# Patient Record
Sex: MALE | Race: WHITE | NOT HISPANIC OR LATINO | Employment: FULL TIME | URBAN - METROPOLITAN AREA
[De-identification: names, ages, dates, MRNs, and addresses within clinical notes are randomized per-mention and may not be internally consistent; named-entity substitution may affect disease eponyms.]

---

## 2019-01-09 ENCOUNTER — HOSPITAL ENCOUNTER (OUTPATIENT)
Dept: OTHER | Facility: HOSPITAL | Age: 44
Discharge: HOME | End: 2019-01-09
Attending: FAMILY MEDICINE

## 2019-01-09 LAB
25(OH)D3 SERPL-MCNC: 24 NG/ML (ref 30–100)
ALBUMIN SERPL-MCNC: 4.6 G/DL (ref 3.5–5)
ALBUMIN/GLOB SERPL: 1.6 {RATIO} (ref 1.4–2.6)
ALP SERPL-CCNC: 55 U/L (ref 53–128)
ALT SERPL-CCNC: 60 U/L (ref 10–40)
ANION GAP SERPL CALC-SCNC: 14 MMOL/L (ref 8–19)
AST SERPL-CCNC: 89 U/L (ref 15–50)
BILIRUB SERPL-MCNC: 0.58 MG/DL (ref 0.2–1.3)
BUN SERPL-MCNC: 15 MG/DL (ref 5–25)
BUN/CREAT SERPL: 13 {RATIO} (ref 6–20)
CALCIUM SERPL-MCNC: 9.2 MG/DL (ref 8.7–10.4)
CHLORIDE SERPL-SCNC: 100 MMOL/L (ref 99–111)
CONV CO2: 28 MMOL/L (ref 22–32)
CONV TOTAL PROTEIN: 7.5 G/DL (ref 6.3–8.2)
CREAT UR-MCNC: 1.18 MG/DL (ref 0.7–1.2)
GFR SERPLBLD BASED ON 1.73 SQ M-ARVRAT: >60 ML/MIN/{1.73_M2}
GLOBULIN UR ELPH-MCNC: 2.9 G/DL (ref 2–3.5)
GLUCOSE SERPL-MCNC: 99 MG/DL (ref 70–99)
OSMOLALITY SERPL CALC.SUM OF ELEC: 287 MOSM/KG (ref 273–304)
POTASSIUM SERPL-SCNC: 3.8 MMOL/L (ref 3.5–5.3)
SODIUM SERPL-SCNC: 138 MMOL/L (ref 135–147)
T4 FREE SERPL-MCNC: 1.1 NG/DL (ref 0.9–1.8)
TSH SERPL-ACNC: 2.76 M[IU]/L (ref 0.27–4.2)
URATE SERPL-MCNC: 10.2 MG/DL (ref 3.5–8.5)

## 2019-02-08 ENCOUNTER — HOSPITAL ENCOUNTER (OUTPATIENT)
Dept: ULTRASOUND IMAGING | Facility: HOSPITAL | Age: 44
Discharge: HOME | End: 2019-02-08
Attending: FAMILY MEDICINE

## 2019-02-08 LAB
ALBUMIN SERPL-MCNC: 4.4 G/DL (ref 3.5–5)
ALBUMIN/GLOB SERPL: 1.5 {RATIO} (ref 1.4–2.6)
ALP SERPL-CCNC: 59 U/L (ref 53–128)
ALT SERPL-CCNC: 33 U/L (ref 10–40)
ANION GAP SERPL CALC-SCNC: 17 MMOL/L (ref 8–19)
AST SERPL-CCNC: 27 U/L (ref 15–50)
BILIRUB SERPL-MCNC: 0.62 MG/DL (ref 0.2–1.3)
BUN SERPL-MCNC: 13 MG/DL (ref 5–25)
BUN/CREAT SERPL: 11 {RATIO} (ref 6–20)
CALCIUM SERPL-MCNC: 9.4 MG/DL (ref 8.7–10.4)
CHLORIDE SERPL-SCNC: 102 MMOL/L (ref 99–111)
CONV AFP: 2.2 NG/ML (ref 0–8.7)
CONV CO2: 25 MMOL/L (ref 22–32)
CONV TOTAL PROTEIN: 7.4 G/DL (ref 6.3–8.2)
CREAT UR-MCNC: 1.15 MG/DL (ref 0.7–1.2)
GFR SERPLBLD BASED ON 1.73 SQ M-ARVRAT: >60 ML/MIN/{1.73_M2}
GLOBULIN UR ELPH-MCNC: 3 G/DL (ref 2–3.5)
GLUCOSE SERPL-MCNC: 88 MG/DL (ref 70–99)
IRON SATN MFR SERPL: 18 % (ref 20–55)
IRON SERPL-MCNC: 89 UG/DL (ref 70–180)
OSMOLALITY SERPL CALC.SUM OF ELEC: 290 MOSM/KG (ref 273–304)
POTASSIUM SERPL-SCNC: 4 MMOL/L (ref 3.5–5.3)
SODIUM SERPL-SCNC: 140 MMOL/L (ref 135–147)
TIBC SERPL-MCNC: 505 UG/DL (ref 245–450)
TRANSFERRIN SERPL-MCNC: 353 MG/DL (ref 215–365)

## 2019-02-09 LAB
CONV HEPATITIS B SURFACE AG W CONFIRMATION RE: NEGATIVE
HAV IGM SERPL QL IA: NEGATIVE
HBV CORE IGM SERPL QL IA: NEGATIVE
HCV AB SER DONR QL: 0.2 S/CO RATIO (ref 0–0.9)
SMOOTH MUSCLE F-ACTIN AB IGG: 13 UNITS (ref 0–19)

## 2019-02-11 LAB — CONV ANTI NUCLEAR ANTIBODY WITH REFLEX: NEGATIVE

## 2019-02-12 LAB — COPPER SERPL-MCNC: 109 UG/DL (ref 72–166)

## 2019-04-26 ENCOUNTER — HOSPITAL ENCOUNTER (OUTPATIENT)
Dept: OTHER | Facility: HOSPITAL | Age: 44
Discharge: HOME | End: 2019-04-26
Attending: INTERNAL MEDICINE

## 2019-04-26 LAB
ANION GAP SERPL CALC-SCNC: 18 MMOL/L (ref 8–19)
BASOPHILS # BLD AUTO: 0.02 10*3/UL (ref 0–0.2)
BASOPHILS NFR BLD AUTO: 0.6 % (ref 0–3)
BUN SERPL-MCNC: 12 MG/DL (ref 5–25)
BUN/CREAT SERPL: 10 {RATIO} (ref 6–20)
CALCIUM SERPL-MCNC: 9.1 MG/DL (ref 8.7–10.4)
CHLORIDE SERPL-SCNC: 99 MMOL/L (ref 99–111)
CONV ABS IMM GRAN: 0.01 10*3/UL (ref 0–0.2)
CONV CO2: 24 MMOL/L (ref 22–32)
CONV IMMATURE GRAN: 0.3 % (ref 0–1.8)
CREAT UR-MCNC: 1.2 MG/DL (ref 0.7–1.2)
DEPRECATED RDW RBC AUTO: 40.4 FL (ref 35.1–43.9)
EOSINOPHIL # BLD AUTO: 0.21 10*3/UL (ref 0–0.7)
EOSINOPHIL # BLD AUTO: 6 % (ref 0–7)
ERYTHROCYTE [DISTWIDTH] IN BLOOD BY AUTOMATED COUNT: 13.7 % (ref 11.6–14.4)
GFR SERPLBLD BASED ON 1.73 SQ M-ARVRAT: >60 ML/MIN/{1.73_M2}
GLUCOSE SERPL-MCNC: 106 MG/DL (ref 70–99)
HBA1C MFR BLD: 12.6 G/DL (ref 14–18)
HCT VFR BLD AUTO: 40.8 % (ref 42–52)
LYMPHOCYTES # BLD AUTO: 1.13 10*3/UL (ref 1–5)
MCH RBC QN AUTO: 25.1 PG (ref 27–31)
MCHC RBC AUTO-ENTMCNC: 30.9 G/DL (ref 33–37)
MCV RBC AUTO: 81.3 FL (ref 80–96)
MONOCYTES # BLD AUTO: 0.33 10*3/UL (ref 0.2–1.2)
MONOCYTES NFR BLD AUTO: 9.4 % (ref 3–10)
NEUTROPHILS # BLD AUTO: 1.82 10*3/UL (ref 2–8)
NEUTROPHILS NFR BLD AUTO: 51.6 % (ref 30–85)
NRBC CBCN: 0 % (ref 0–0.7)
OSMOLALITY SERPL CALC.SUM OF ELEC: 284 MOSM/KG (ref 273–304)
PLATELET # BLD AUTO: 207 10*3/UL (ref 130–400)
PMV BLD AUTO: 9.1 FL (ref 9.4–12.4)
POTASSIUM SERPL-SCNC: 4 MMOL/L (ref 3.5–5.3)
RBC # BLD AUTO: 5.02 10*6/UL (ref 4.7–6.1)
SODIUM SERPL-SCNC: 137 MMOL/L (ref 135–147)
T4 FREE SERPL-MCNC: 1.3 NG/DL (ref 0.9–1.8)
TSH SERPL-ACNC: 4.07 M[IU]/L (ref 0.27–4.2)
VARIANT LYMPHS NFR BLD MANUAL: 32.1 % (ref 20–45)
WBC # BLD AUTO: 3.52 10*3/UL (ref 4.8–10.8)

## 2019-09-04 ENCOUNTER — HOSPITAL ENCOUNTER (OUTPATIENT)
Dept: ULTRASOUND IMAGING | Facility: HOSPITAL | Age: 44
Discharge: HOME | End: 2019-09-04
Attending: FAMILY MEDICINE

## 2019-10-17 ENCOUNTER — HOSPITAL ENCOUNTER (OUTPATIENT)
Dept: ULTRASOUND IMAGING | Facility: HOSPITAL | Age: 44
Discharge: HOME | End: 2019-10-17
Attending: FAMILY MEDICINE

## 2019-10-24 ENCOUNTER — HOSPITAL ENCOUNTER (OUTPATIENT)
Dept: OTHER | Facility: HOSPITAL | Age: 44
Discharge: HOME | End: 2019-10-24
Attending: FAMILY MEDICINE

## 2019-10-24 ENCOUNTER — OFFICE VISIT CONVERTED (OUTPATIENT)
Dept: GASTROENTEROLOGY | Facility: CLINIC | Age: 44
End: 2019-10-24
Attending: PHYSICIAN ASSISTANT

## 2019-10-24 LAB
25(OH)D3 SERPL-MCNC: 28.6 NG/ML (ref 30–100)
ALBUMIN SERPL-MCNC: 4.9 G/DL (ref 3.5–5)
ALBUMIN/GLOB SERPL: 1.8 {RATIO} (ref 1.4–2.6)
ALP SERPL-CCNC: 61 U/L (ref 53–128)
ALT SERPL-CCNC: 37 U/L (ref 10–40)
ANION GAP SERPL CALC-SCNC: 16 MMOL/L (ref 8–19)
AST SERPL-CCNC: 35 U/L (ref 15–50)
BILIRUB SERPL-MCNC: 0.39 MG/DL (ref 0.2–1.3)
BUN SERPL-MCNC: 14 MG/DL (ref 5–25)
BUN/CREAT SERPL: 14 {RATIO} (ref 6–20)
CALCIUM SERPL-MCNC: 9.7 MG/DL (ref 8.7–10.4)
CHLORIDE SERPL-SCNC: 104 MMOL/L (ref 99–111)
CONV CO2: 27 MMOL/L (ref 22–32)
CONV TOTAL PROTEIN: 7.7 G/DL (ref 6.3–8.2)
CREAT UR-MCNC: 0.97 MG/DL (ref 0.7–1.2)
GFR SERPLBLD BASED ON 1.73 SQ M-ARVRAT: >60 ML/MIN/{1.73_M2}
GLOBULIN UR ELPH-MCNC: 2.8 G/DL (ref 2–3.5)
GLUCOSE SERPL-MCNC: 85 MG/DL (ref 70–99)
OSMOLALITY SERPL CALC.SUM OF ELEC: 294 MOSM/KG (ref 273–304)
POTASSIUM SERPL-SCNC: 5 MMOL/L (ref 3.5–5.3)
SODIUM SERPL-SCNC: 142 MMOL/L (ref 135–147)
T4 FREE SERPL-MCNC: 1.2 NG/DL (ref 0.9–1.8)
TSH SERPL-ACNC: 3.01 M[IU]/L (ref 0.27–4.2)
URATE SERPL-MCNC: 8.7 MG/DL (ref 3.5–8.5)

## 2019-11-15 ENCOUNTER — HOSPITAL ENCOUNTER (OUTPATIENT)
Dept: GASTROENTEROLOGY | Facility: HOSPITAL | Age: 44
Setting detail: HOSPITAL OUTPATIENT SURGERY
Discharge: HOME | End: 2019-11-15
Attending: INTERNAL MEDICINE

## 2019-11-20 ENCOUNTER — HOSPITAL ENCOUNTER (OUTPATIENT)
Dept: GENERAL RADIOLOGY | Facility: HOSPITAL | Age: 44
Discharge: HOME | End: 2019-11-20
Attending: OTOLARYNGOLOGY

## 2019-11-20 LAB
ANION GAP SERPL CALC-SCNC: 17 MMOL/L (ref 8–19)
APTT BLD: 24 S (ref 22.2–34.2)
BASOPHILS # BLD AUTO: 0.03 10*3/UL (ref 0–0.2)
BASOPHILS NFR BLD AUTO: 0.6 % (ref 0–3)
BUN SERPL-MCNC: 13 MG/DL (ref 5–25)
BUN/CREAT SERPL: 11 {RATIO} (ref 6–20)
CALCIUM SERPL-MCNC: 9.6 MG/DL (ref 8.7–10.4)
CHLORIDE SERPL-SCNC: 100 MMOL/L (ref 99–111)
CONV ABS IMM GRAN: 0.02 10*3/UL (ref 0–0.2)
CONV CO2: 25 MMOL/L (ref 22–32)
CONV IMMATURE GRAN: 0.4 % (ref 0–1.8)
CREAT UR-MCNC: 1.16 MG/DL (ref 0.7–1.2)
DEPRECATED RDW RBC AUTO: 43.8 FL (ref 35.1–43.9)
EOSINOPHIL # BLD AUTO: 0.34 10*3/UL (ref 0–0.7)
EOSINOPHIL # BLD AUTO: 6.6 % (ref 0–7)
ERYTHROCYTE [DISTWIDTH] IN BLOOD BY AUTOMATED COUNT: 14.5 % (ref 11.6–14.4)
GFR SERPLBLD BASED ON 1.73 SQ M-ARVRAT: >60 ML/MIN/{1.73_M2}
GLUCOSE SERPL-MCNC: 92 MG/DL (ref 70–99)
HCT VFR BLD AUTO: 43.5 % (ref 42–52)
HGB BLD-MCNC: 13.7 G/DL (ref 14–18)
INR PPP: 0.96 (ref 2–3)
LYMPHOCYTES # BLD AUTO: 1.46 10*3/UL (ref 1–5)
LYMPHOCYTES NFR BLD AUTO: 28.4 % (ref 20–45)
MCH RBC QN AUTO: 26.3 PG (ref 27–31)
MCHC RBC AUTO-ENTMCNC: 31.5 G/DL (ref 33–37)
MCV RBC AUTO: 83.7 FL (ref 80–96)
MONOCYTES # BLD AUTO: 0.53 10*3/UL (ref 0.2–1.2)
MONOCYTES NFR BLD AUTO: 10.3 % (ref 3–10)
NEUTROPHILS # BLD AUTO: 2.76 10*3/UL (ref 2–8)
NEUTROPHILS NFR BLD AUTO: 53.7 % (ref 30–85)
NRBC CBCN: 0 % (ref 0–0.7)
OSMOLALITY SERPL CALC.SUM OF ELEC: 286 MOSM/KG (ref 273–304)
PLATELET # BLD AUTO: 197 10*3/UL (ref 130–400)
PMV BLD AUTO: 9.2 FL (ref 9.4–12.4)
POTASSIUM SERPL-SCNC: 3.6 MMOL/L (ref 3.5–5.3)
PROTHROMBIN TIME: 10.4 S (ref 9.4–12)
RBC # BLD AUTO: 5.2 10*6/UL (ref 4.7–6.1)
SODIUM SERPL-SCNC: 138 MMOL/L (ref 135–147)
T4 FREE SERPL-MCNC: 1.2 NG/DL (ref 0.9–1.8)
TSH SERPL-ACNC: 2.86 M[IU]/L (ref 0.27–4.2)
WBC # BLD AUTO: 5.14 10*3/UL (ref 4.8–10.8)

## 2019-12-09 ENCOUNTER — HOSPITAL ENCOUNTER (OUTPATIENT)
Dept: OTHER | Facility: HOSPITAL | Age: 44
Discharge: HOME | End: 2019-12-09
Attending: PHYSICIAN ASSISTANT

## 2019-12-09 LAB
CALCIUM SERPL-MCNC: 10.4 MG/DL (ref 8.7–10.4)
PTH-INTACT SERPL-MCNC: 22.3 PG/ML (ref 11.1–79.5)

## 2020-01-29 ENCOUNTER — HOSPITAL ENCOUNTER (OUTPATIENT)
Dept: OTHER | Facility: HOSPITAL | Age: 45
Discharge: HOME | End: 2020-01-29
Attending: FAMILY MEDICINE

## 2020-01-29 LAB
25(OH)D3 SERPL-MCNC: 26.2 NG/ML (ref 30–100)
ALBUMIN SERPL-MCNC: 4.7 G/DL (ref 3.5–5)
ALBUMIN/GLOB SERPL: 1.6 {RATIO} (ref 1.4–2.6)
ALP SERPL-CCNC: 53 U/L (ref 53–128)
ALT SERPL-CCNC: 45 U/L (ref 10–40)
ANION GAP SERPL CALC-SCNC: 16 MMOL/L (ref 8–19)
AST SERPL-CCNC: 31 U/L (ref 15–50)
BILIRUB SERPL-MCNC: 0.46 MG/DL (ref 0.2–1.3)
BUN SERPL-MCNC: 16 MG/DL (ref 5–25)
BUN/CREAT SERPL: 14 {RATIO} (ref 6–20)
CALCIUM SERPL-MCNC: 9.9 MG/DL (ref 8.7–10.4)
CHLORIDE SERPL-SCNC: 102 MMOL/L (ref 99–111)
CONV CO2: 26 MMOL/L (ref 22–32)
CONV TOTAL PROTEIN: 7.7 G/DL (ref 6.3–8.2)
CREAT UR-MCNC: 1.12 MG/DL (ref 0.7–1.2)
GFR SERPLBLD BASED ON 1.73 SQ M-ARVRAT: >60 ML/MIN/{1.73_M2}
GLOBULIN UR ELPH-MCNC: 3 G/DL (ref 2–3.5)
GLUCOSE SERPL-MCNC: 87 MG/DL (ref 70–99)
OSMOLALITY SERPL CALC.SUM OF ELEC: 291 MOSM/KG (ref 273–304)
POTASSIUM SERPL-SCNC: 4 MMOL/L (ref 3.5–5.3)
SODIUM SERPL-SCNC: 140 MMOL/L (ref 135–147)
T4 FREE SERPL-MCNC: 1.3 NG/DL (ref 0.9–1.8)
TSH SERPL-ACNC: 8.03 M[IU]/L (ref 0.27–4.2)
URATE SERPL-MCNC: 6.2 MG/DL (ref 3.5–8.5)

## 2020-05-06 ENCOUNTER — HOSPITAL ENCOUNTER (OUTPATIENT)
Dept: OTHER | Facility: HOSPITAL | Age: 45
Discharge: HOME | End: 2020-05-06
Attending: FAMILY MEDICINE

## 2020-05-06 LAB
ALBUMIN SERPL-MCNC: 4.4 G/DL (ref 3.5–5)
ALBUMIN/GLOB SERPL: 1.5 {RATIO} (ref 1.4–2.6)
ALP SERPL-CCNC: 62 U/L (ref 53–128)
ALT SERPL-CCNC: 43 U/L (ref 10–40)
ANION GAP SERPL CALC-SCNC: 20 MMOL/L (ref 8–19)
AST SERPL-CCNC: 32 U/L (ref 15–50)
BILIRUB SERPL-MCNC: 0.45 MG/DL (ref 0.2–1.3)
BUN SERPL-MCNC: 13 MG/DL (ref 5–25)
BUN/CREAT SERPL: 11 {RATIO} (ref 6–20)
CALCIUM SERPL-MCNC: 9.2 MG/DL (ref 8.7–10.4)
CHLORIDE SERPL-SCNC: 101 MMOL/L (ref 99–111)
CONV CO2: 23 MMOL/L (ref 22–32)
CONV TOTAL PROTEIN: 7.4 G/DL (ref 6.3–8.2)
CREAT UR-MCNC: 1.19 MG/DL (ref 0.7–1.2)
GFR SERPLBLD BASED ON 1.73 SQ M-ARVRAT: >60 ML/MIN/{1.73_M2}
GLOBULIN UR ELPH-MCNC: 3 G/DL (ref 2–3.5)
GLUCOSE SERPL-MCNC: 112 MG/DL (ref 70–99)
OSMOLALITY SERPL CALC.SUM OF ELEC: 291 MOSM/KG (ref 273–304)
POTASSIUM SERPL-SCNC: 4 MMOL/L (ref 3.5–5.3)
SODIUM SERPL-SCNC: 140 MMOL/L (ref 135–147)
T4 FREE SERPL-MCNC: 1.2 NG/DL (ref 0.9–1.8)
TSH SERPL-ACNC: 2.76 M[IU]/L (ref 0.27–4.2)

## 2020-05-07 LAB — 25(OH)D3 SERPL-MCNC: 24 NG/ML (ref 30–100)

## 2020-06-11 ENCOUNTER — HOSPITAL ENCOUNTER (OUTPATIENT)
Dept: OTHER | Facility: HOSPITAL | Age: 45
Discharge: HOME | End: 2020-06-11
Attending: FAMILY MEDICINE

## 2020-06-11 LAB
25(OH)D3 SERPL-MCNC: 29.2 NG/ML (ref 30–100)
ALBUMIN SERPL-MCNC: 4.2 G/DL (ref 3.5–5)
ALBUMIN/GLOB SERPL: 1.6 {RATIO} (ref 1.4–2.6)
ALP SERPL-CCNC: 61 U/L (ref 53–128)
ALT SERPL-CCNC: 44 U/L (ref 10–40)
ANION GAP SERPL CALC-SCNC: 16 MMOL/L (ref 8–19)
AST SERPL-CCNC: 33 U/L (ref 15–50)
BILIRUB SERPL-MCNC: 0.43 MG/DL (ref 0.2–1.3)
BUN SERPL-MCNC: 17 MG/DL (ref 5–25)
BUN/CREAT SERPL: 15 {RATIO} (ref 6–20)
CALCIUM SERPL-MCNC: 9.2 MG/DL (ref 8.7–10.4)
CHLORIDE SERPL-SCNC: 106 MMOL/L (ref 99–111)
CONV CO2: 24 MMOL/L (ref 22–32)
CONV TOTAL PROTEIN: 6.9 G/DL (ref 6.3–8.2)
CREAT UR-MCNC: 1.13 MG/DL (ref 0.7–1.2)
GFR SERPLBLD BASED ON 1.73 SQ M-ARVRAT: >60 ML/MIN/{1.73_M2}
GLOBULIN UR ELPH-MCNC: 2.7 G/DL (ref 2–3.5)
GLUCOSE SERPL-MCNC: 94 MG/DL (ref 70–99)
OSMOLALITY SERPL CALC.SUM OF ELEC: 295 MOSM/KG (ref 273–304)
POTASSIUM SERPL-SCNC: 4.1 MMOL/L (ref 3.5–5.3)
SODIUM SERPL-SCNC: 142 MMOL/L (ref 135–147)

## 2021-01-25 ENCOUNTER — HOSPITAL ENCOUNTER (OUTPATIENT)
Dept: OTHER | Facility: HOSPITAL | Age: 46
Discharge: HOME | End: 2021-01-25
Attending: FAMILY MEDICINE

## 2021-01-25 LAB
25(OH)D3 SERPL-MCNC: 25.3 NG/ML (ref 30–100)
ALBUMIN SERPL-MCNC: 4.6 G/DL (ref 3.5–5)
ALBUMIN/GLOB SERPL: 1.5 {RATIO} (ref 1.4–2.6)
ALP SERPL-CCNC: 66 U/L (ref 53–128)
ALT SERPL-CCNC: 72 U/L (ref 10–40)
ANION GAP SERPL CALC-SCNC: 16 MMOL/L (ref 8–19)
AST SERPL-CCNC: 42 U/L (ref 15–50)
BILIRUB SERPL-MCNC: 0.64 MG/DL (ref 0.2–1.3)
BUN SERPL-MCNC: 20 MG/DL (ref 5–25)
BUN/CREAT SERPL: 19 {RATIO} (ref 6–20)
CALCIUM SERPL-MCNC: 9.7 MG/DL (ref 8.7–10.4)
CHLORIDE SERPL-SCNC: 99 MMOL/L (ref 99–111)
CONV CO2: 28 MMOL/L (ref 22–32)
CONV TOTAL PROTEIN: 7.6 G/DL (ref 6.3–8.2)
CREAT UR-MCNC: 1.06 MG/DL (ref 0.7–1.2)
GFR SERPLBLD BASED ON 1.73 SQ M-ARVRAT: >60 ML/MIN/{1.73_M2}
GLOBULIN UR ELPH-MCNC: 3 G/DL (ref 2–3.5)
GLUCOSE SERPL-MCNC: 94 MG/DL (ref 70–99)
OSMOLALITY SERPL CALC.SUM OF ELEC: 290 MOSM/KG (ref 273–304)
POTASSIUM SERPL-SCNC: 4.2 MMOL/L (ref 3.5–5.3)
SODIUM SERPL-SCNC: 139 MMOL/L (ref 135–147)

## 2021-01-27 LAB — URATE SERPL-MCNC: 7 MG/DL (ref 3.5–8.5)

## 2021-02-09 ENCOUNTER — HOSPITAL ENCOUNTER (OUTPATIENT)
Dept: ULTRASOUND IMAGING | Facility: HOSPITAL | Age: 46
Discharge: HOME | End: 2021-02-09
Attending: FAMILY MEDICINE

## 2021-03-05 ENCOUNTER — HOSPITAL ENCOUNTER (OUTPATIENT)
Dept: OTHER | Facility: HOSPITAL | Age: 46
Discharge: HOME | End: 2021-03-05
Attending: FAMILY MEDICINE

## 2021-03-05 LAB
ALBUMIN SERPL-MCNC: 4.3 G/DL (ref 3.5–5)
ALBUMIN/GLOB SERPL: 1.7 {RATIO} (ref 1.4–2.6)
ALP SERPL-CCNC: 65 U/L (ref 53–128)
ALT SERPL-CCNC: 69 U/L (ref 10–40)
ANION GAP SERPL CALC-SCNC: 14 MMOL/L (ref 8–19)
AST SERPL-CCNC: 38 U/L (ref 15–50)
BILIRUB SERPL-MCNC: 0.49 MG/DL (ref 0.2–1.3)
BUN SERPL-MCNC: 17 MG/DL (ref 5–25)
BUN/CREAT SERPL: 13 {RATIO} (ref 6–20)
CALCIUM SERPL-MCNC: 9.4 MG/DL (ref 8.7–10.4)
CHLORIDE SERPL-SCNC: 102 MMOL/L (ref 99–111)
CONV CO2: 27 MMOL/L (ref 22–32)
CONV TOTAL PROTEIN: 6.9 G/DL (ref 6.3–8.2)
CREAT UR-MCNC: 1.27 MG/DL (ref 0.7–1.2)
GFR SERPLBLD BASED ON 1.73 SQ M-ARVRAT: >60 ML/MIN/{1.73_M2}
GLOBULIN UR ELPH-MCNC: 2.6 G/DL (ref 2–3.5)
GLUCOSE SERPL-MCNC: 88 MG/DL (ref 70–99)
OSMOLALITY SERPL CALC.SUM OF ELEC: 289 MOSM/KG (ref 273–304)
POTASSIUM SERPL-SCNC: 4 MMOL/L (ref 3.5–5.3)
SODIUM SERPL-SCNC: 139 MMOL/L (ref 135–147)

## 2021-04-07 ENCOUNTER — HOSPITAL ENCOUNTER (OUTPATIENT)
Dept: OTHER | Facility: HOSPITAL | Age: 46
Discharge: HOME | End: 2021-04-07
Attending: FAMILY MEDICINE

## 2021-04-07 LAB
ALBUMIN SERPL-MCNC: 4.4 G/DL (ref 3.5–5)
ALBUMIN/GLOB SERPL: 1.5 {RATIO} (ref 1.4–2.6)
ALP SERPL-CCNC: 63 U/L (ref 53–128)
ALT SERPL-CCNC: 66 U/L (ref 10–40)
ANION GAP SERPL CALC-SCNC: 13 MMOL/L (ref 8–19)
AST SERPL-CCNC: 37 U/L (ref 15–50)
BASOPHILS # BLD AUTO: 0.04 10*3/UL (ref 0–0.2)
BASOPHILS NFR BLD AUTO: 0.7 % (ref 0–3)
BILIRUB SERPL-MCNC: 0.56 MG/DL (ref 0.2–1.3)
BUN SERPL-MCNC: 13 MG/DL (ref 5–25)
BUN/CREAT SERPL: 12 {RATIO} (ref 6–20)
CALCIUM SERPL-MCNC: 9.5 MG/DL (ref 8.7–10.4)
CHLORIDE SERPL-SCNC: 102 MMOL/L (ref 99–111)
CONV ABS IMM GRAN: 0.02 10*3/UL (ref 0–0.2)
CONV CO2: 28 MMOL/L (ref 22–32)
CONV IMMATURE GRAN: 0.3 % (ref 0–1.8)
CONV TOTAL PROTEIN: 7.3 G/DL (ref 6.3–8.2)
CREAT UR-MCNC: 1.13 MG/DL (ref 0.7–1.2)
DEPRECATED RDW RBC AUTO: 41.9 FL (ref 35.1–43.9)
EOSINOPHIL # BLD AUTO: 0.32 10*3/UL (ref 0–0.7)
EOSINOPHIL # BLD AUTO: 5.5 % (ref 0–7)
ERYTHROCYTE [DISTWIDTH] IN BLOOD BY AUTOMATED COUNT: 13.1 % (ref 11.6–14.4)
GFR SERPLBLD BASED ON 1.73 SQ M-ARVRAT: >60 ML/MIN/{1.73_M2}
GLOBULIN UR ELPH-MCNC: 2.9 G/DL (ref 2–3.5)
GLUCOSE SERPL-MCNC: 75 MG/DL (ref 70–99)
HCT VFR BLD AUTO: 45 % (ref 42–52)
HGB BLD-MCNC: 15.2 G/DL (ref 14–18)
LYMPHOCYTES # BLD AUTO: 1.7 10*3/UL (ref 1–5)
LYMPHOCYTES NFR BLD AUTO: 29 % (ref 20–45)
MCH RBC QN AUTO: 29.8 PG (ref 27–31)
MCHC RBC AUTO-ENTMCNC: 33.8 G/DL (ref 33–37)
MCV RBC AUTO: 88.2 FL (ref 80–96)
MONOCYTES # BLD AUTO: 0.71 10*3/UL (ref 0.2–1.2)
MONOCYTES NFR BLD AUTO: 12.1 % (ref 3–10)
NEUTROPHILS # BLD AUTO: 3.07 10*3/UL (ref 2–8)
NEUTROPHILS NFR BLD AUTO: 52.4 % (ref 30–85)
NRBC CBCN: 0 % (ref 0–0.7)
OSMOLALITY SERPL CALC.SUM OF ELEC: 287 MOSM/KG (ref 273–304)
PLATELET # BLD AUTO: 191 10*3/UL (ref 130–400)
PMV BLD AUTO: 9.3 FL (ref 9.4–12.4)
POTASSIUM SERPL-SCNC: 4.1 MMOL/L (ref 3.5–5.3)
RBC # BLD AUTO: 5.1 10*6/UL (ref 4.7–6.1)
SODIUM SERPL-SCNC: 139 MMOL/L (ref 135–147)
T4 FREE SERPL-MCNC: 1.4 NG/DL (ref 0.9–1.8)
TSH SERPL-ACNC: 0.71 M[IU]/L (ref 0.27–4.2)
WBC # BLD AUTO: 5.86 10*3/UL (ref 4.8–10.8)

## 2021-04-09 LAB — SARS-COV-2 AB SERPL QL IA: NEGATIVE

## 2021-04-19 ENCOUNTER — HOSPITAL ENCOUNTER (OUTPATIENT)
Dept: CARDIOLOGY | Facility: HOSPITAL | Age: 46
Discharge: HOME | End: 2021-04-19
Attending: FAMILY MEDICINE

## 2021-05-06 ENCOUNTER — CONVERSION ENCOUNTER (OUTPATIENT)
Dept: GASTROENTEROLOGY | Facility: CLINIC | Age: 46
End: 2021-05-06

## 2021-05-06 ENCOUNTER — OFFICE VISIT CONVERTED (OUTPATIENT)
Dept: GASTROENTEROLOGY | Facility: CLINIC | Age: 46
End: 2021-05-06
Attending: NURSE PRACTITIONER

## 2021-05-15 VITALS
HEIGHT: 73 IN | DIASTOLIC BLOOD PRESSURE: 90 MMHG | RESPIRATION RATE: 12 BRPM | SYSTOLIC BLOOD PRESSURE: 149 MMHG | BODY MASS INDEX: 38.61 KG/M2 | WEIGHT: 291.31 LBS | OXYGEN SATURATION: 97 % | HEART RATE: 66 BPM

## 2021-05-19 ENCOUNTER — HOSPITAL ENCOUNTER (OUTPATIENT)
Dept: OTHER | Facility: HOSPITAL | Age: 46
Discharge: HOME | End: 2021-05-19
Attending: NURSE PRACTITIONER

## 2021-05-19 LAB
25(OH)D3 SERPL-MCNC: 24.8 NG/ML (ref 30–100)
ALBUMIN SERPL-MCNC: 4.4 G/DL (ref 3.5–5)
ALBUMIN/GLOB SERPL: 1.4 {RATIO} (ref 1.4–2.6)
ALP SERPL-CCNC: 66 U/L (ref 53–128)
ALT SERPL-CCNC: 82 U/L (ref 10–40)
ANION GAP SERPL CALC-SCNC: 16 MMOL/L (ref 8–19)
AST SERPL-CCNC: 47 U/L (ref 15–50)
BASOPHILS # BLD AUTO: 0.02 10*3/UL (ref 0–0.2)
BASOPHILS NFR BLD AUTO: 0.3 % (ref 0–3)
BILIRUB SERPL-MCNC: 0.44 MG/DL (ref 0.2–1.3)
BUN SERPL-MCNC: 14 MG/DL (ref 5–25)
BUN/CREAT SERPL: 12 {RATIO} (ref 6–20)
CALCIUM SERPL-MCNC: 9 MG/DL (ref 8.7–10.4)
CHLORIDE SERPL-SCNC: 102 MMOL/L (ref 99–111)
CONV ABS IMM GRAN: 0.03 10*3/UL (ref 0–0.2)
CONV CO2: 26 MMOL/L (ref 22–32)
CONV IMMATURE GRAN: 0.5 % (ref 0–1.8)
CONV TOTAL PROTEIN: 7.6 G/DL (ref 6.3–8.2)
CREAT UR-MCNC: 1.15 MG/DL (ref 0.7–1.2)
DEPRECATED RDW RBC AUTO: 42.2 FL (ref 35.1–43.9)
EOSINOPHIL # BLD AUTO: 0.32 10*3/UL (ref 0–0.7)
EOSINOPHIL # BLD AUTO: 5 % (ref 0–7)
ERYTHROCYTE [DISTWIDTH] IN BLOOD BY AUTOMATED COUNT: 13.7 % (ref 11.6–14.4)
FERRITIN SERPL-MCNC: 64 NG/ML (ref 30–300)
GFR SERPLBLD BASED ON 1.73 SQ M-ARVRAT: >60 ML/MIN/{1.73_M2}
GLOBULIN UR ELPH-MCNC: 3.2 G/DL (ref 2–3.5)
GLUCOSE SERPL-MCNC: 92 MG/DL (ref 70–99)
HCT VFR BLD AUTO: 43.5 % (ref 42–52)
HGB BLD-MCNC: 15.3 G/DL (ref 14–18)
IRON SATN MFR SERPL: 15 % (ref 20–55)
IRON SERPL-MCNC: 68 UG/DL (ref 70–180)
LYMPHOCYTES # BLD AUTO: 2.11 10*3/UL (ref 1–5)
LYMPHOCYTES NFR BLD AUTO: 33.1 % (ref 20–45)
MCH RBC QN AUTO: 30.4 PG (ref 27–31)
MCHC RBC AUTO-ENTMCNC: 35.2 G/DL (ref 33–37)
MCV RBC AUTO: 86.3 FL (ref 80–96)
MONOCYTES # BLD AUTO: 0.52 10*3/UL (ref 0.2–1.2)
MONOCYTES NFR BLD AUTO: 8.2 % (ref 3–10)
NEUTROPHILS # BLD AUTO: 3.37 10*3/UL (ref 2–8)
NEUTROPHILS NFR BLD AUTO: 52.9 % (ref 30–85)
NRBC CBCN: 0 % (ref 0–0.7)
OSMOLALITY SERPL CALC.SUM OF ELEC: 290 MOSM/KG (ref 273–304)
PLATELET # BLD AUTO: 204 10*3/UL (ref 130–400)
PMV BLD AUTO: 9.3 FL (ref 9.4–12.4)
POTASSIUM SERPL-SCNC: 3.8 MMOL/L (ref 3.5–5.3)
RBC # BLD AUTO: 5.04 10*6/UL (ref 4.7–6.1)
SODIUM SERPL-SCNC: 140 MMOL/L (ref 135–147)
TIBC SERPL-MCNC: 443 UG/DL (ref 245–450)
TRANSFERRIN SERPL-MCNC: 310 MG/DL (ref 215–365)
WBC # BLD AUTO: 6.37 10*3/UL (ref 4.8–10.8)

## 2021-05-20 LAB
CERULOPLASMIN SERPL-MCNC: 23.5 MG/DL (ref 16–31)
CONV HEPATITIS B SURFACE AG W CONFIRMATION RE: NEGATIVE
DEPRECATED MITOCHONDRIA M2 IGG SER-ACNC: <20 UNITS (ref 0–20)
HAV IGM SERPL QL IA: NEGATIVE
HBV CORE IGM SERPL QL IA: NEGATIVE
HCV AB SER DONR QL: 0.2 S/CO RATIO (ref 0–0.9)
SMOOTH MUSCLE F-ACTIN AB IGG: 7 UNITS (ref 0–19)

## 2021-05-21 LAB
DSDNA AB SER-ACNC: NEGATIVE [IU]/ML
ENA AB SER IA-ACNC: NEGATIVE {RATIO}

## 2021-05-24 LAB
A1AT SERPL-MCNC: 125 MG/DL (ref 101–187)
PHENOTYPE: NORMAL

## 2021-06-06 NOTE — PROGRESS NOTES
Progress Note      Patient Name: Alli Craft   Patient ID: [id number]   Sex: Male   YOB: 1975    Primary Care Provider: Rosa Campbell MD   Referring Provider: Rosa Campbell MD    Visit Date: May 6, 2021    Provider: AICHA Emery   Location: [facility]  [place]   Location Address: [address] [place] [address]  [place], [place]  [id number]   Location Phone: [phone]          Chief Complaint  · Elevated LFTs      History Of Present Illness     45-year-old male with a history of normocytic anemia and reflux returns after period of long absence. His ALT was recently elevated.  He reports drinking 1-2 beers per day for the past 20 years.  He denies IVDA, blood transfusions, and history of hepatitis.  He does have one tattoo.  There is no known family history of liver disease.  He is not diabetic.  He has gained 25 pounds within past 6 months.  He has had a thyroidectomy within the past year.  He denies jaundice, itching, nausea, vomiting, fatigue, and right upper quadrant pain.  He does report dark-colored urine, which he attributes to being dehydrated. Right upper quadrant ultrasound February 2020 was unremarkable. His colonoscopy/EGD November 2019 by Dr. Reid revealed a medium size hiatal hernia, grade 2 esophagitis, and grade 1 internal hemorrhoids.  Recommend repeat colonoscopy when he turns 52.    Labs 04/2021: Hemoglobin 15.2, hematocrit 45, platelets 191, ALT 66, AST 37, alk phos 63, total bili 0.56, creatinine 1.13, GFR greater than 60         Past Medical History  Anxiety; Essential hypertension; GERD (gastroesophageal reflux disease); Irritable bowel syndrome; Sleep apnea         Past Surgical History  Colonoscopy; EGD; Hernia Repair; Knee repair         Medication List  fluticasone propionate 50 mcg/actuation nasal spray,suspension; levothyroxine 112 mcg oral tablet; lisinopril-hydrochlorothiazide 10-12.5 mg oral tablet; pantoprazole 40 mg oral tablet,delayed release  "(/EC); paroxetine HCl 20 mg oral tablet         Allergy List  PENICILLINS       Allergies Reconciled  Family Medical History  Colon Neoplasm, Malignant         Social History  Alcohol (Current some day); lives with spouse; ; Tobacco (Current some day); Working         Review of Systems  · Constitutional  o Denies  o : chills, fever  · Cardiovascular  o Denies  o : chest pain  · Respiratory  o Denies  o : shortness of breath  · Gastrointestinal  o Denies  o : nausea, vomiting, dysphagia  · Endocrine  o Denies  o : weight gain, weight loss      Vitals  Date Time BP Position Site L\R Cuff Size HR RR TEMP (F) WT  HT  BMI kg/m2 BSA m2 O2 Sat FR L/min FiO2 HC       05/06/2021 09:00 /80 Sitting    59 - R 16  312lbs 16oz 6'  1\" 41.29 2.7 99 %            Physical Examination  · Constitutional  o Appearance  o : obese, well developed  · Head and Face  o Head  o : Normocephalic with no worriesome skin lesions  · Respiratory  o Auscultation of Lungs  o : Chest is clear to auscultation bilaterally.  · Cardiovascular  o Heart  o : Cardiovascular exam reveals a regular rate and rhythm.  o Peripheral Vascular System  o :   § Extremities  § : no cyanosis, clubbing or edema;   · Gastrointestinal  o Abdominal Examination  o : Abdomen is soft, nontender to palpation, with normal active bowel sounds, no appreciable hepatosplenomegaly.          Assessment  · Gastroesophageal Reflux     530.81/K21.9  · Elevated LFTs     790.6/R79.89      Plan  · Orders  o Smooth muscle Ab (83054, 41272) - 790.6/R79.89 - 05/06/2021  o Alpha 1 antitrypsin (41266) - 790.6/R79.89 - 05/06/2021  o CBC with Auto Diff [facility] (54949) - 790.6/R79.89 - 05/06/2021  o CMP [facility] (40795) - 790.6/R79.89 - 05/06/2021  o LAURA (antinuclear antibody profile) by enzyme immunoassay (72405) - 790.6/R79.89 - 05/06/2021  o Anti-mitochondrial antibody assay (36300) - 790.6/R79.89 - 05/06/2021  o Ferritin assay (00574) - 790.6/R79.89 - 05/06/2021  o Ceruloplasmin (18541) - " 790.6/R79.89 - 05/06/2021  o Acute hepatitis panel (HAV IgM, HbcAb IgM, HbsAg, HCV) (58680, 03836, 36506, 39252, 50643) - 790.6/R79.89 - 05/06/2021  o Iron panel (iron, TIBC, transferrin saturation) (62607, 89444, 73738) - 790.6/R79.89 - 05/06/2021  · Medications  o Medications have been Reconciled  o Transition of Care or Provider Policy  · Instructions  o 44-year-old male with a history of normocytic anemia and reflux returns for evaluation of elevated liver enzymes. We had discussed various etiologies and sequelae of elevated liver enzymes. We are going to order a full liver work-up through labs. We will call him with results. We have discussed that if his labs are unremarkable, I recommend healthy diet, regular exercise, avoidance of alcohol, and a vitamin E supplement. I have given him handout on dietary guidelines for NAFLD.            Electronically Signed by: AICHA Emery -Author on May 6, 2021 09:27:00 AM  Electronically Co-signed by: Alli Reid MD -Reviewer on May 12, 2021 02:10:43 PM

## 2021-07-15 VITALS
BODY MASS INDEX: 41.48 KG/M2 | OXYGEN SATURATION: 99 % | HEIGHT: 73 IN | RESPIRATION RATE: 16 BRPM | HEART RATE: 59 BPM | WEIGHT: 313 LBS | DIASTOLIC BLOOD PRESSURE: 80 MMHG | SYSTOLIC BLOOD PRESSURE: 140 MMHG

## 2021-08-24 ENCOUNTER — OFFICE VISIT (OUTPATIENT)
Dept: GASTROENTEROLOGY | Facility: CLINIC | Age: 46
End: 2021-08-24

## 2021-08-24 ENCOUNTER — PREP FOR SURGERY (OUTPATIENT)
Dept: OTHER | Facility: HOSPITAL | Age: 46
End: 2021-08-24

## 2021-08-24 VITALS
SYSTOLIC BLOOD PRESSURE: 135 MMHG | WEIGHT: 310.1 LBS | OXYGEN SATURATION: 96 % | BODY MASS INDEX: 41.1 KG/M2 | HEART RATE: 77 BPM | DIASTOLIC BLOOD PRESSURE: 74 MMHG | HEIGHT: 73 IN

## 2021-08-24 DIAGNOSIS — Z80.0 FAMILY HISTORY OF COLON CANCER: ICD-10-CM

## 2021-08-24 DIAGNOSIS — K92.1 HEMATOCHEZIA: Primary | ICD-10-CM

## 2021-08-24 DIAGNOSIS — Z80.0 FAMILY HISTORY OF COLON CANCER IN FATHER: ICD-10-CM

## 2021-08-24 DIAGNOSIS — R19.7 DIARRHEA, UNSPECIFIED TYPE: ICD-10-CM

## 2021-08-24 DIAGNOSIS — D50.9 IRON DEFICIENCY ANEMIA, UNSPECIFIED IRON DEFICIENCY ANEMIA TYPE: ICD-10-CM

## 2021-08-24 DIAGNOSIS — K92.1 HEMATOCHEZIA: ICD-10-CM

## 2021-08-24 DIAGNOSIS — R19.4 ALTERED BOWEL HABITS: Primary | ICD-10-CM

## 2021-08-24 DIAGNOSIS — R19.5 LOOSE STOOLS: ICD-10-CM

## 2021-08-24 DIAGNOSIS — K64.9 HEMORRHOIDS, UNSPECIFIED HEMORRHOID TYPE: ICD-10-CM

## 2021-08-24 PROCEDURE — 99214 OFFICE O/P EST MOD 30 MIN: CPT | Performed by: NURSE PRACTITIONER

## 2021-08-24 RX ORDER — PYRIDOXINE HCL (VITAMIN B6) 25 MG
1 LOZENGE ON A HANDLE MUCOUS MEMBRANE WEEKLY
COMMUNITY
Start: 2021-06-21 | End: 2021-09-21

## 2021-08-24 RX ORDER — LEVOTHYROXINE SODIUM 175 UG/1
175 TABLET ORAL EVERY MORNING
COMMUNITY
Start: 2021-08-02

## 2021-08-24 RX ORDER — HYDROCORTISONE ACETATE 25 MG/1
25 SUPPOSITORY RECTAL 2 TIMES DAILY PRN
Qty: 30 SUPPOSITORY | Refills: 0 | Status: SHIPPED | OUTPATIENT
Start: 2021-08-24

## 2021-08-24 RX ORDER — ALLOPURINOL 100 MG/1
100 TABLET ORAL DAILY
COMMUNITY
Start: 2021-08-21

## 2021-08-24 RX ORDER — PAROXETINE HYDROCHLORIDE 20 MG/1
20 TABLET, FILM COATED ORAL EVERY MORNING
COMMUNITY
Start: 2021-07-30

## 2021-08-24 RX ORDER — FLUTICASONE PROPIONATE 50 MCG
1 SPRAY, SUSPENSION (ML) NASAL DAILY PRN
COMMUNITY

## 2021-08-24 RX ORDER — METOPROLOL SUCCINATE 25 MG/1
25 TABLET, EXTENDED RELEASE ORAL DAILY
COMMUNITY
Start: 2021-07-20

## 2021-08-24 RX ORDER — LISINOPRIL AND HYDROCHLOROTHIAZIDE 20; 12.5 MG/1; MG/1
1 TABLET ORAL EVERY MORNING
COMMUNITY
Start: 2021-06-30

## 2021-08-24 NOTE — PROGRESS NOTES
Chief Complaint  Rectal Bleeding and Diarrhea    History of Present Illness  Alli Craft is a 46 y.o. male who presents to [facility] for follow-up     46-year-old male presenting to the office today with a family history of colon cancer in his father and great grandfather, rectal bleeding and change in stool patterns.  Patient reports that he has been having diarrhea and loose stools 3-4 times a day.  He reports rectal bleeding with most stools over the past 2 months.  He reports that the hematochezia is bright red.  He does have a history of internal hemorrhoids.  He denies any melena.  Patient denies fever, nausea, vomiting, weight loss, night sweats, melena,hematemesis.    Endoscopy: Review of the patient's most recent EGD and colonoscopy performed by Dr. Reid on 11/15/2019 revealed a medium size hiatal hernia grade 2 esophagitis normal mucosa throughout the stomach Grade 1 internal hemorrhoids with normal mucosa throughout the colon.Stomach, GE junction and midesophagus biopsies negative    Labs: 5/19/2021 CBC unremarkable, iron profile saturation 15%.  Total iron 68    Past Medical History:   Diagnosis Date   • Anxiety    • COPD (chronic obstructive pulmonary disease) (CMS/HCC)    • Essential hypertension    • GERD (gastroesophageal reflux disease)    • Irritable bowel syndrome    • Sleep apnea    • Vasovagal syncope        Past Surgical History:   Procedure Laterality Date   • COLONOSCOPY  2013 2019   • ENDOSCOPY  2013 2019   • HERNIA REPAIR     • KNEE SURGERY      KNEE REPAIR   • THYROIDECTOMY     • US GUIDED FINE NEEDLE ASPIRATION  10/17/2019   • US GUIDED FINE NEEDLE ASPIRATION  10/17/2019         Current Outpatient Medications:   •  allopurinol (ZYLOPRIM) 100 MG tablet, Take 100 mg by mouth Daily., Disp: , Rfl:   •  fluticasone (FLONASE) 50 MCG/ACT nasal spray, 1 spray into the nostril(s) as directed by provider Daily As Needed., Disp: , Rfl:   •  levothyroxine  "(SYNTHROID, LEVOTHROID) 175 MCG tablet, Take 175 mcg by mouth Every Morning., Disp: , Rfl:   •  lisinopril-hydrochlorothiazide (PRINZIDE,ZESTORETIC) 20-12.5 MG per tablet, Take 1 tablet by mouth Every Morning., Disp: , Rfl:   •  metoprolol succinate XL (TOPROL-XL) 25 MG 24 hr tablet, Take 25 mg by mouth Daily., Disp: , Rfl:   •  PARoxetine (PAXIL) 20 MG tablet, Take 20 mg by mouth Every Morning., Disp: , Rfl:   •  Replesta 1.25 MG (16674 UT) wafer, Take 1 tablet by mouth 1 (One) Time Per Week., Disp: , Rfl:   •  hydrocortisone (ANUSOL-HC) 25 MG suppository, Insert 1 suppository into the rectum 2 (Two) Times a Day As Needed for Hemorrhoids (rectal discomfort)., Disp: 30 suppository, Rfl: 0     Allergies   Allergen Reactions   • Penicillins Itching, Swelling and Rash       Family History   Problem Relation Age of Onset   • Colon cancer Maternal Grandfather         Social History     Social History Narrative   • Not on file       Objective         Vital Signs:   /74 (BP Location: Left arm, Patient Position: Sitting, Cuff Size: Adult)   Pulse 77   Ht 185.4 cm (73\")   Wt (!) 141 kg (310 lb 1.6 oz)   SpO2 96%   BMI 40.91 kg/m²       Physical Exam  Constitutional:       General: He is not in acute distress.     Appearance: Normal appearance.   HENT:      Head: Normocephalic.   Eyes:      Conjunctiva/sclera: Conjunctivae normal.      Pupils: Pupils are equal, round, and reactive to light.      Visual Fields: Right eye visual fields normal and left eye visual fields normal.   Neck:      Trachea: Trachea normal.   Cardiovascular:      Rate and Rhythm: Normal rate and regular rhythm.      Heart sounds: Normal heart sounds.   Pulmonary:      Effort: Pulmonary effort is normal.      Breath sounds: Normal breath sounds and air entry.      Comments: Inspection of chest: normal appearance  Abdominal:      General: Abdomen is flat. Bowel sounds are normal.      Palpations: Abdomen is soft. There is no mass.      " Tenderness: There is no guarding.   Musculoskeletal:      Right lower leg: No edema.      Left lower leg: No edema.   Skin:     Findings: No lesion.      Comments: Turgor is normal   Neurological:      Mental Status: He is alert and oriented to person, place, and time.   Psychiatric:         Mood and Affect: Mood and affect normal.         Result Review :{Labs  Result Review  Imaging  Med Tab  Media  Procedures :23}                  Assessment and Plan    Diagnoses and all orders for this visit:    1. Altered bowel habits (Primary)  -     CBC & Differential; Future  -     Iron Profile; Future  -     Comprehensive Metabolic Panel; Future    2. Diarrhea, unspecified type  -     CBC & Differential; Future  -     Iron Profile; Future  -     Comprehensive Metabolic Panel; Future    3. Hematochezia  -     CBC & Differential; Future  -     Iron Profile; Future  -     Comprehensive Metabolic Panel; Future    4. Family history of colon cancer  -     CBC & Differential; Future  -     Iron Profile; Future  -     Comprehensive Metabolic Panel; Future    5. Hemorrhoids, unspecified hemorrhoid type  -     CBC & Differential; Future  -     Iron Profile; Future  -     Comprehensive Metabolic Panel; Future    6. Iron deficiency anemia, unspecified iron deficiency anemia type  -     CBC & Differential; Future  -     Iron Profile; Future  -     Comprehensive Metabolic Panel; Future    Other orders  -     hydrocortisone (ANUSOL-HC) 25 MG suppository; Insert 1 suppository into the rectum 2 (Two) Times a Day As Needed for Hemorrhoids (rectal discomfort).  Dispense: 30 suppository; Refill: 0        46-year-old male presenting to the office today with a family history of colon cancer in his father and great grandfather, rectal bleeding and change in stool patterns.  Plan:  I have recommended that the patient undergo further evaluation with a colonoscopy.  I have discussed this procedure in detail with the patient.  I have discussed  the risks, benefits and alternatives.  I have discussed the risk of anesthesia, bleeding and perforation.  Patient understands these risks, benefits and alternatives and wishes to proceed.  I will schedule him at his earliest convenience.  I have prescribed Anusol suppositories for rectal bleeding to see if this helps improve his symptoms.  Patient is agreeable to this plan.  We will follow up in the office after endoscopy.  I have ordered a CBC iron and CMP for the patient to complete at his convenience.            Follow Up   Return for Follow up after endoscopy in office.  Patient was given instructions and counseling regarding his condition or for health maintenance advice. Please see specific information pulled into the AVS if appropriate.

## 2021-09-03 ENCOUNTER — TELEPHONE (OUTPATIENT)
Dept: GASTROENTEROLOGY | Facility: CLINIC | Age: 46
End: 2021-09-03

## 2021-09-03 NOTE — TELEPHONE ENCOUNTER
I spoke with patient regarding lab order. Patient states his father passed earlier this week and he would like for me to extend the lab order for 2 more weeks. Patient aware to have labs in the next couple of weeks. Patient agreed to this plan.

## 2021-09-21 NOTE — PRE-PROCEDURE INSTRUCTIONS
Patient stated has instructions on bowel prep and voices no questions. Patient instructed to take all medications except Lisinopril/HCTZ.

## 2021-09-23 ENCOUNTER — LAB (OUTPATIENT)
Dept: LAB | Facility: HOSPITAL | Age: 46
End: 2021-09-23

## 2021-09-23 ENCOUNTER — HOSPITAL ENCOUNTER (OUTPATIENT)
Facility: HOSPITAL | Age: 46
Setting detail: HOSPITAL OUTPATIENT SURGERY
Discharge: HOME | End: 2021-09-23
Attending: INTERNAL MEDICINE | Admitting: INTERNAL MEDICINE

## 2021-09-23 ENCOUNTER — ANESTHESIA (OUTPATIENT)
Dept: GASTROENTEROLOGY | Facility: HOSPITAL | Age: 46
End: 2021-09-23

## 2021-09-23 ENCOUNTER — ANESTHESIA EVENT (OUTPATIENT)
Dept: GASTROENTEROLOGY | Facility: HOSPITAL | Age: 46
End: 2021-09-23

## 2021-09-23 ENCOUNTER — TRANSCRIBE ORDERS (OUTPATIENT)
Dept: GASTROENTEROLOGY | Facility: HOSPITAL | Age: 46
End: 2021-09-23

## 2021-09-23 VITALS
WEIGHT: 300.71 LBS | OXYGEN SATURATION: 96 % | HEART RATE: 62 BPM | HEIGHT: 73 IN | TEMPERATURE: 97.8 F | RESPIRATION RATE: 13 BRPM | DIASTOLIC BLOOD PRESSURE: 77 MMHG | BODY MASS INDEX: 39.85 KG/M2 | SYSTOLIC BLOOD PRESSURE: 121 MMHG

## 2021-09-23 DIAGNOSIS — E55.9 VITAMIN D DEFICIENCY DISEASE: ICD-10-CM

## 2021-09-23 DIAGNOSIS — K92.1 HEMATOCHEZIA: ICD-10-CM

## 2021-09-23 DIAGNOSIS — R19.5 LOOSE STOOLS: ICD-10-CM

## 2021-09-23 DIAGNOSIS — Z80.0 FAMILY HISTORY OF COLON CANCER IN FATHER: ICD-10-CM

## 2021-09-23 DIAGNOSIS — Z80.0 FAMILY HISTORY OF COLON CANCER: ICD-10-CM

## 2021-09-23 DIAGNOSIS — E55.9 VITAMIN D DEFICIENCY DISEASE: Primary | ICD-10-CM

## 2021-09-23 LAB
25(OH)D3 SERPL-MCNC: 47.8 NG/ML
ALBUMIN SERPL-MCNC: 4.7 G/DL (ref 3.5–5.2)
ALBUMIN/GLOB SERPL: 1.6 G/DL
ALP SERPL-CCNC: 57 U/L (ref 39–117)
ALT SERPL W P-5'-P-CCNC: 92 U/L (ref 1–41)
ANION GAP SERPL CALCULATED.3IONS-SCNC: 10.9 MMOL/L (ref 5–15)
AST SERPL-CCNC: 65 U/L (ref 1–40)
BILIRUB SERPL-MCNC: 0.9 MG/DL (ref 0–1.2)
BUN SERPL-MCNC: 12 MG/DL (ref 6–20)
BUN/CREAT SERPL: 11.5 (ref 7–25)
CALCIUM SPEC-SCNC: 9.5 MG/DL (ref 8.6–10.5)
CHLORIDE SERPL-SCNC: 98 MMOL/L (ref 98–107)
CO2 SERPL-SCNC: 27.1 MMOL/L (ref 22–29)
CREAT SERPL-MCNC: 1.04 MG/DL (ref 0.76–1.27)
GFR SERPL CREATININE-BSD FRML MDRD: 78 ML/MIN/1.73
GLOBULIN UR ELPH-MCNC: 2.9 GM/DL
GLUCOSE SERPL-MCNC: 95 MG/DL (ref 65–99)
POTASSIUM SERPL-SCNC: 4.1 MMOL/L (ref 3.5–5.2)
PROT SERPL-MCNC: 7.6 G/DL (ref 6–8.5)
SODIUM SERPL-SCNC: 136 MMOL/L (ref 136–145)

## 2021-09-23 PROCEDURE — 36415 COLL VENOUS BLD VENIPUNCTURE: CPT

## 2021-09-23 PROCEDURE — 45380 COLONOSCOPY AND BIOPSY: CPT | Performed by: INTERNAL MEDICINE

## 2021-09-23 PROCEDURE — 25010000002 PROPOFOL 10 MG/ML EMULSION: Performed by: NURSE ANESTHETIST, CERTIFIED REGISTERED

## 2021-09-23 PROCEDURE — 80053 COMPREHEN METABOLIC PANEL: CPT

## 2021-09-23 PROCEDURE — 88305 TISSUE EXAM BY PATHOLOGIST: CPT | Performed by: INTERNAL MEDICINE

## 2021-09-23 PROCEDURE — 82306 VITAMIN D 25 HYDROXY: CPT

## 2021-09-23 RX ORDER — LIDOCAINE HYDROCHLORIDE 20 MG/ML
INJECTION, SOLUTION INFILTRATION; PERINEURAL AS NEEDED
Status: DISCONTINUED | OUTPATIENT
Start: 2021-09-23 | End: 2021-09-23 | Stop reason: SURG

## 2021-09-23 RX ORDER — SODIUM CHLORIDE, SODIUM LACTATE, POTASSIUM CHLORIDE, CALCIUM CHLORIDE 600; 310; 30; 20 MG/100ML; MG/100ML; MG/100ML; MG/100ML
30 INJECTION, SOLUTION INTRAVENOUS CONTINUOUS
Status: DISCONTINUED | OUTPATIENT
Start: 2021-09-23 | End: 2021-09-23 | Stop reason: HOSPADM

## 2021-09-23 RX ORDER — DICYCLOMINE HCL 20 MG
20 TABLET ORAL 3 TIMES DAILY PRN
Qty: 90 TABLET | Refills: 5 | Status: SHIPPED | OUTPATIENT
Start: 2021-09-23 | End: 2022-12-12

## 2021-09-23 RX ORDER — PROPOFOL 10 MG/ML
VIAL (ML) INTRAVENOUS AS NEEDED
Status: DISCONTINUED | OUTPATIENT
Start: 2021-09-23 | End: 2021-09-23 | Stop reason: SURG

## 2021-09-23 RX ADMIN — LIDOCAINE HYDROCHLORIDE 50 MG: 20 INJECTION, SOLUTION INFILTRATION; PERINEURAL at 10:03

## 2021-09-23 RX ADMIN — PROPOFOL 150 MCG/KG/MIN: 10 INJECTION, EMULSION INTRAVENOUS at 10:15

## 2021-09-23 RX ADMIN — SODIUM CHLORIDE, POTASSIUM CHLORIDE, SODIUM LACTATE AND CALCIUM CHLORIDE 30 ML/HR: 600; 310; 30; 20 INJECTION, SOLUTION INTRAVENOUS at 09:10

## 2021-09-23 RX ADMIN — PROPOFOL 140 MG: 10 INJECTION, EMULSION INTRAVENOUS at 10:15

## 2021-09-23 NOTE — ANESTHESIA PREPROCEDURE EVALUATION
Anesthesia Evaluation     Patient summary reviewed and Nursing notes reviewed   no history of anesthetic complications:  NPO Solid Status: > 8 hours  NPO Liquid Status: > 2 hours           Airway   Mallampati: II  TM distance: >3 FB  Neck ROM: full  No difficulty expected  Dental - normal exam     Pulmonary - normal exam   (+) COPD, sleep apnea,   Cardiovascular - normal exam  Exercise tolerance: good (4-7 METS)    Patient on routine beta blocker    (+) hypertension,       Neuro/Psych  (+) syncope (vasovagal episode), psychiatric history Anxiety,     GI/Hepatic/Renal/Endo    (+) obesity,  GERD, GI bleeding lower , thyroid problem hypothyroidism    Musculoskeletal (-) negative ROS    Abdominal  - normal exam    Bowel sounds: normal.   Substance History - negative use     OB/GYN negative ob/gyn ROS         Other - negative ROS                       Anesthesia Plan    ASA 3     general   total IV anesthesia  intravenous induction     Anesthetic plan, all risks, benefits, and alternatives have been provided, discussed and informed consent has been obtained with: patient.

## 2021-09-23 NOTE — ANESTHESIA POSTPROCEDURE EVALUATION
Patient: Alli Craft    Procedure Summary     Date: 09/23/21 Room / Location: [facility] ENDOSCOPY 3 / [facility] ENDOSCOPY    Anesthesia Start: 1001 Anesthesia Stop: 1033    Procedure: COLONOSCOPY (N/A ) Diagnosis:       Hematochezia      Loose stools      Family history of colon cancer      Family history of colon cancer in father      (Hematochezia [K92.1])      (Loose stools [R19.5])      (Family history of colon cancer [Z80.0])      (Family history of colon cancer in father [Z80.0])    Surgeons: Alli Reid MD Provider: Dominic Garcia MD    Anesthesia Type: general ASA Status: 3          Anesthesia Type: general    Vitals  Vitals Value Taken Time   /77 09/23/21 1046   Temp 36.6 °C (97.8 °F) 09/23/21 1046   Pulse 62 09/23/21 1046   Resp 13 09/23/21 1046   SpO2 96 % 09/23/21 1046           Post Anesthesia Care and Evaluation    Patient location during evaluation: PHASE II  Patient participation: complete - patient participated  Level of consciousness: awake and alert  Pain score: 0  Pain management: adequate  Airway patency: patent  Anesthetic complications: No anesthetic complications  PONV Status: none  Cardiovascular status: acceptable  Respiratory status: acceptable  Hydration status: acceptable  No anesthesia care post op

## 2021-09-24 LAB
CYTO UR: NORMAL
LAB AP CASE REPORT: NORMAL
LAB AP CLINICAL INFORMATION: NORMAL
PATH REPORT.FINAL DX SPEC: NORMAL
PATH REPORT.GROSS SPEC: NORMAL

## 2022-03-04 ENCOUNTER — LAB (OUTPATIENT)
Dept: LAB | Facility: HOSPITAL | Age: 47
End: 2022-03-04

## 2022-03-04 ENCOUNTER — TRANSCRIBE ORDERS (OUTPATIENT)
Dept: ADMINISTRATIVE | Facility: HOSPITAL | Age: 47
End: 2022-03-04

## 2022-03-04 DIAGNOSIS — R94.5 NONSPECIFIC ABNORMAL RESULTS OF LIVER FUNCTION STUDY: ICD-10-CM

## 2022-03-04 DIAGNOSIS — R94.5 NONSPECIFIC ABNORMAL RESULTS OF LIVER FUNCTION STUDY: Primary | ICD-10-CM

## 2022-03-04 DIAGNOSIS — E03.9 HYPOTHYROIDISM, UNSPECIFIED TYPE: ICD-10-CM

## 2022-03-04 LAB
ALBUMIN SERPL-MCNC: 5 G/DL (ref 3.5–5.2)
ALBUMIN/GLOB SERPL: 1.8 G/DL
ALP SERPL-CCNC: 57 U/L (ref 39–117)
ALT SERPL W P-5'-P-CCNC: 50 U/L (ref 1–41)
ANION GAP SERPL CALCULATED.3IONS-SCNC: 12.7 MMOL/L (ref 5–15)
AST SERPL-CCNC: 28 U/L (ref 1–40)
BILIRUB SERPL-MCNC: 0.6 MG/DL (ref 0–1.2)
BUN SERPL-MCNC: 16 MG/DL (ref 6–20)
BUN/CREAT SERPL: 12.1 (ref 7–25)
CALCIUM SPEC-SCNC: 10 MG/DL (ref 8.6–10.5)
CHLORIDE SERPL-SCNC: 99 MMOL/L (ref 98–107)
CO2 SERPL-SCNC: 25.3 MMOL/L (ref 22–29)
CREAT SERPL-MCNC: 1.32 MG/DL (ref 0.76–1.27)
EGFRCR SERPLBLD CKD-EPI 2021: 68.2 ML/MIN/1.73
GLOBULIN UR ELPH-MCNC: 2.8 GM/DL
GLUCOSE SERPL-MCNC: 80 MG/DL (ref 65–99)
POTASSIUM SERPL-SCNC: 4.3 MMOL/L (ref 3.5–5.2)
PROT SERPL-MCNC: 7.8 G/DL (ref 6–8.5)
SODIUM SERPL-SCNC: 137 MMOL/L (ref 136–145)
T4 FREE SERPL-MCNC: 1.3 NG/DL (ref 0.93–1.7)
TSH SERPL DL<=0.05 MIU/L-ACNC: 5.02 UIU/ML (ref 0.27–4.2)

## 2022-03-04 PROCEDURE — 84443 ASSAY THYROID STIM HORMONE: CPT

## 2022-03-04 PROCEDURE — 84439 ASSAY OF FREE THYROXINE: CPT

## 2022-03-04 PROCEDURE — 36415 COLL VENOUS BLD VENIPUNCTURE: CPT

## 2022-03-04 PROCEDURE — 80053 COMPREHEN METABOLIC PANEL: CPT

## 2022-05-09 ENCOUNTER — LAB (OUTPATIENT)
Dept: LAB | Facility: HOSPITAL | Age: 47
End: 2022-05-09

## 2022-05-09 ENCOUNTER — TRANSCRIBE ORDERS (OUTPATIENT)
Dept: ADMINISTRATIVE | Facility: HOSPITAL | Age: 47
End: 2022-05-09

## 2022-05-09 DIAGNOSIS — R94.5 LIVER FUNCTION STUDY, ABNORMAL: ICD-10-CM

## 2022-05-09 DIAGNOSIS — E03.9 HYPOTHYROIDISM, UNSPECIFIED TYPE: Primary | ICD-10-CM

## 2022-05-09 DIAGNOSIS — E03.9 HYPOTHYROIDISM, UNSPECIFIED TYPE: ICD-10-CM

## 2022-05-09 LAB
ALBUMIN SERPL-MCNC: 4.9 G/DL (ref 3.5–5.2)
ALBUMIN/GLOB SERPL: 2 G/DL
ALP SERPL-CCNC: 62 U/L (ref 39–117)
ALT SERPL W P-5'-P-CCNC: 38 U/L (ref 1–41)
ANION GAP SERPL CALCULATED.3IONS-SCNC: 12 MMOL/L (ref 5–15)
AST SERPL-CCNC: 30 U/L (ref 1–40)
BILIRUB SERPL-MCNC: 0.7 MG/DL (ref 0–1.2)
BUN SERPL-MCNC: 13 MG/DL (ref 6–20)
BUN/CREAT SERPL: 14.1 (ref 7–25)
CALCIUM SPEC-SCNC: 10.1 MG/DL (ref 8.6–10.5)
CHLORIDE SERPL-SCNC: 100 MMOL/L (ref 98–107)
CO2 SERPL-SCNC: 26 MMOL/L (ref 22–29)
CREAT SERPL-MCNC: 0.92 MG/DL (ref 0.76–1.27)
EGFRCR SERPLBLD CKD-EPI 2021: 105.2 ML/MIN/1.73
GLOBULIN UR ELPH-MCNC: 2.5 GM/DL
GLUCOSE SERPL-MCNC: 90 MG/DL (ref 65–99)
POTASSIUM SERPL-SCNC: 4 MMOL/L (ref 3.5–5.2)
PROT SERPL-MCNC: 7.4 G/DL (ref 6–8.5)
SODIUM SERPL-SCNC: 138 MMOL/L (ref 136–145)
T4 FREE SERPL-MCNC: 1.57 NG/DL (ref 0.93–1.7)
TSH SERPL DL<=0.05 MIU/L-ACNC: 0.71 UIU/ML (ref 0.27–4.2)

## 2022-05-09 PROCEDURE — 80053 COMPREHEN METABOLIC PANEL: CPT

## 2022-05-09 PROCEDURE — 84443 ASSAY THYROID STIM HORMONE: CPT

## 2022-05-09 PROCEDURE — 84439 ASSAY OF FREE THYROXINE: CPT

## 2022-05-09 PROCEDURE — 36415 COLL VENOUS BLD VENIPUNCTURE: CPT

## 2022-09-26 ENCOUNTER — TRANSCRIBE ORDERS (OUTPATIENT)
Dept: ADMINISTRATIVE | Facility: HOSPITAL | Age: 47
End: 2022-09-26

## 2022-09-26 ENCOUNTER — LAB (OUTPATIENT)
Dept: LAB | Facility: HOSPITAL | Age: 47
End: 2022-09-26

## 2022-09-26 DIAGNOSIS — E03.9 PRIMARY HYPOTHYROIDISM: ICD-10-CM

## 2022-09-26 DIAGNOSIS — I10 ESSENTIAL HYPERTENSION, MALIGNANT: ICD-10-CM

## 2022-09-26 DIAGNOSIS — E78.2 MIXED HYPERLIPIDEMIA: ICD-10-CM

## 2022-09-26 DIAGNOSIS — E78.2 MIXED HYPERLIPIDEMIA: Primary | ICD-10-CM

## 2022-09-26 LAB
ALBUMIN SERPL-MCNC: 4.7 G/DL (ref 3.5–5.2)
ALBUMIN/GLOB SERPL: 1.9 G/DL
ALP SERPL-CCNC: 54 U/L (ref 39–117)
ALT SERPL W P-5'-P-CCNC: 34 U/L (ref 1–41)
ANION GAP SERPL CALCULATED.3IONS-SCNC: 9.6 MMOL/L (ref 5–15)
AST SERPL-CCNC: 30 U/L (ref 1–40)
BILIRUB SERPL-MCNC: 0.6 MG/DL (ref 0–1.2)
BUN SERPL-MCNC: 14 MG/DL (ref 6–20)
BUN/CREAT SERPL: 14.4 (ref 7–25)
CALCIUM SPEC-SCNC: 9.4 MG/DL (ref 8.6–10.5)
CHLORIDE SERPL-SCNC: 101 MMOL/L (ref 98–107)
CHOLEST SERPL-MCNC: 231 MG/DL (ref 0–200)
CO2 SERPL-SCNC: 29.4 MMOL/L (ref 22–29)
CREAT SERPL-MCNC: 0.97 MG/DL (ref 0.76–1.27)
EGFRCR SERPLBLD CKD-EPI 2021: 98.1 ML/MIN/1.73
GLOBULIN UR ELPH-MCNC: 2.5 GM/DL
GLUCOSE SERPL-MCNC: 81 MG/DL (ref 65–99)
HDLC SERPL-MCNC: 53 MG/DL (ref 40–60)
LDLC SERPL CALC-MCNC: 144 MG/DL (ref 0–100)
LDLC/HDLC SERPL: 2.65 {RATIO}
POTASSIUM SERPL-SCNC: 4.2 MMOL/L (ref 3.5–5.2)
PROT SERPL-MCNC: 7.2 G/DL (ref 6–8.5)
SODIUM SERPL-SCNC: 140 MMOL/L (ref 136–145)
T4 FREE SERPL-MCNC: 1.24 NG/DL (ref 0.93–1.7)
TRIGL SERPL-MCNC: 189 MG/DL (ref 0–150)
TSH SERPL DL<=0.05 MIU/L-ACNC: 3.04 UIU/ML (ref 0.27–4.2)
VLDLC SERPL-MCNC: 34 MG/DL (ref 5–40)

## 2022-09-26 PROCEDURE — 80053 COMPREHEN METABOLIC PANEL: CPT

## 2022-09-26 PROCEDURE — 36415 COLL VENOUS BLD VENIPUNCTURE: CPT

## 2022-09-26 PROCEDURE — 80061 LIPID PANEL: CPT

## 2022-09-26 PROCEDURE — 84443 ASSAY THYROID STIM HORMONE: CPT

## 2022-09-26 PROCEDURE — 84439 ASSAY OF FREE THYROXINE: CPT

## 2022-12-12 RX ORDER — DICYCLOMINE HCL 20 MG
TABLET ORAL
Qty: 270 TABLET | Refills: 0 | Status: SHIPPED | OUTPATIENT
Start: 2022-12-12

## 2023-05-01 ENCOUNTER — LAB (OUTPATIENT)
Dept: LAB | Facility: HOSPITAL | Age: 48
End: 2023-05-01
Payer: COMMERCIAL

## 2023-05-01 ENCOUNTER — TRANSCRIBE ORDERS (OUTPATIENT)
Dept: LAB | Facility: HOSPITAL | Age: 48
End: 2023-05-01
Payer: COMMERCIAL

## 2023-05-01 DIAGNOSIS — E78.5 HYPERLIPIDEMIA, UNSPECIFIED HYPERLIPIDEMIA TYPE: ICD-10-CM

## 2023-05-01 DIAGNOSIS — E11.9 DIABETES MELLITUS WITHOUT COMPLICATION: Primary | ICD-10-CM

## 2023-05-01 LAB
ALBUMIN SERPL-MCNC: 4.2 G/DL (ref 3.5–5.2)
ALBUMIN/GLOB SERPL: 1.7 G/DL
ALP SERPL-CCNC: 60 U/L (ref 39–117)
ALT SERPL W P-5'-P-CCNC: 47 U/L (ref 1–41)
ANION GAP SERPL CALCULATED.3IONS-SCNC: 7.5 MMOL/L (ref 5–15)
AST SERPL-CCNC: 34 U/L (ref 1–40)
BILIRUB SERPL-MCNC: 0.3 MG/DL (ref 0–1.2)
BUN SERPL-MCNC: 14 MG/DL (ref 6–20)
BUN/CREAT SERPL: 14.1 (ref 7–25)
CALCIUM SPEC-SCNC: 9.4 MG/DL (ref 8.6–10.5)
CHLORIDE SERPL-SCNC: 104 MMOL/L (ref 98–107)
CHOLEST SERPL-MCNC: 218 MG/DL (ref 0–200)
CO2 SERPL-SCNC: 28.5 MMOL/L (ref 22–29)
CREAT SERPL-MCNC: 0.99 MG/DL (ref 0.76–1.27)
EGFRCR SERPLBLD CKD-EPI 2021: 95.7 ML/MIN/1.73
GLOBULIN UR ELPH-MCNC: 2.5 GM/DL
GLUCOSE SERPL-MCNC: 105 MG/DL (ref 65–99)
HDLC SERPL-MCNC: 46 MG/DL (ref 40–60)
LDLC SERPL CALC-MCNC: 125 MG/DL (ref 0–100)
LDLC/HDLC SERPL: 2.57 {RATIO}
POTASSIUM SERPL-SCNC: 4.4 MMOL/L (ref 3.5–5.2)
PROT SERPL-MCNC: 6.7 G/DL (ref 6–8.5)
SODIUM SERPL-SCNC: 140 MMOL/L (ref 136–145)
TRIGL SERPL-MCNC: 268 MG/DL (ref 0–150)
VLDLC SERPL-MCNC: 47 MG/DL (ref 5–40)

## 2023-05-01 PROCEDURE — 36415 COLL VENOUS BLD VENIPUNCTURE: CPT

## 2023-05-01 PROCEDURE — 80061 LIPID PANEL: CPT

## 2023-05-01 PROCEDURE — 80053 COMPREHEN METABOLIC PANEL: CPT

## 2023-05-04 ENCOUNTER — LAB (OUTPATIENT)
Dept: LAB | Facility: HOSPITAL | Age: 48
End: 2023-05-04
Payer: COMMERCIAL

## 2023-05-04 DIAGNOSIS — E78.5 HYPERLIPIDEMIA, UNSPECIFIED HYPERLIPIDEMIA TYPE: ICD-10-CM

## 2023-05-04 DIAGNOSIS — E11.9 DIABETES MELLITUS WITHOUT COMPLICATION: ICD-10-CM

## 2023-05-04 LAB — HBA1C MFR BLD: 5.4 % (ref 4.8–5.6)

## 2023-05-04 PROCEDURE — 83036 HEMOGLOBIN GLYCOSYLATED A1C: CPT

## 2023-05-04 PROCEDURE — 36415 COLL VENOUS BLD VENIPUNCTURE: CPT

## 2023-12-19 ENCOUNTER — TRANSCRIBE ORDERS (OUTPATIENT)
Dept: LAB | Facility: HOSPITAL | Age: 48
End: 2023-12-19
Payer: COMMERCIAL

## 2023-12-19 ENCOUNTER — LAB (OUTPATIENT)
Dept: LAB | Facility: HOSPITAL | Age: 48
End: 2023-12-19
Payer: COMMERCIAL

## 2023-12-19 DIAGNOSIS — I10 ESSENTIAL HYPERTENSION, MALIGNANT: ICD-10-CM

## 2023-12-19 DIAGNOSIS — E11.9 DIABETES MELLITUS WITHOUT COMPLICATION: Primary | ICD-10-CM

## 2023-12-19 DIAGNOSIS — E11.9 DIABETES MELLITUS WITHOUT COMPLICATION: ICD-10-CM

## 2023-12-19 LAB
ALBUMIN SERPL-MCNC: 4.5 G/DL (ref 3.5–5.2)
ALBUMIN/GLOB SERPL: 2 G/DL
ALP SERPL-CCNC: 57 U/L (ref 39–117)
ALT SERPL W P-5'-P-CCNC: 71 U/L (ref 1–41)
ANION GAP SERPL CALCULATED.3IONS-SCNC: 12.6 MMOL/L (ref 5–15)
AST SERPL-CCNC: 51 U/L (ref 1–40)
BILIRUB SERPL-MCNC: 0.8 MG/DL (ref 0–1.2)
BUN SERPL-MCNC: 16 MG/DL (ref 6–20)
BUN/CREAT SERPL: 15 (ref 7–25)
CALCIUM SPEC-SCNC: 9.5 MG/DL (ref 8.6–10.5)
CHLORIDE SERPL-SCNC: 101 MMOL/L (ref 98–107)
CO2 SERPL-SCNC: 24.4 MMOL/L (ref 22–29)
CREAT SERPL-MCNC: 1.07 MG/DL (ref 0.76–1.27)
EGFRCR SERPLBLD CKD-EPI 2021: 86.7 ML/MIN/1.73
GLOBULIN UR ELPH-MCNC: 2.2 GM/DL
GLUCOSE SERPL-MCNC: 98 MG/DL (ref 65–99)
HBA1C MFR BLD: 5.2 % (ref 4.8–5.6)
POTASSIUM SERPL-SCNC: 4 MMOL/L (ref 3.5–5.2)
PROT SERPL-MCNC: 6.7 G/DL (ref 6–8.5)
SODIUM SERPL-SCNC: 138 MMOL/L (ref 136–145)

## 2023-12-19 PROCEDURE — 36415 COLL VENOUS BLD VENIPUNCTURE: CPT

## 2023-12-19 PROCEDURE — 83036 HEMOGLOBIN GLYCOSYLATED A1C: CPT

## 2023-12-19 PROCEDURE — 80053 COMPREHEN METABOLIC PANEL: CPT

## 2024-02-12 ENCOUNTER — TRANSCRIBE ORDERS (OUTPATIENT)
Dept: LAB | Facility: HOSPITAL | Age: 49
End: 2024-02-12
Payer: COMMERCIAL

## 2024-02-12 ENCOUNTER — LAB (OUTPATIENT)
Dept: LAB | Facility: HOSPITAL | Age: 49
End: 2024-02-12
Payer: COMMERCIAL

## 2024-02-12 DIAGNOSIS — E03.9 HYPOTHYROIDISM, UNSPECIFIED TYPE: ICD-10-CM

## 2024-02-12 DIAGNOSIS — E78.5 HYPERLIPIDEMIA, UNSPECIFIED HYPERLIPIDEMIA TYPE: ICD-10-CM

## 2024-02-12 DIAGNOSIS — E78.5 HYPERLIPIDEMIA, UNSPECIFIED HYPERLIPIDEMIA TYPE: Primary | ICD-10-CM

## 2024-02-12 LAB
ALBUMIN SERPL-MCNC: 4.5 G/DL (ref 3.5–5.2)
ALBUMIN/GLOB SERPL: 2 G/DL
ALP SERPL-CCNC: 47 U/L (ref 39–117)
ALT SERPL W P-5'-P-CCNC: 98 U/L (ref 1–41)
ANION GAP SERPL CALCULATED.3IONS-SCNC: 12.2 MMOL/L (ref 5–15)
AST SERPL-CCNC: 53 U/L (ref 1–40)
BILIRUB SERPL-MCNC: 0.7 MG/DL (ref 0–1.2)
BUN SERPL-MCNC: 21 MG/DL (ref 6–20)
BUN/CREAT SERPL: 21.4 (ref 7–25)
CALCIUM SPEC-SCNC: 9 MG/DL (ref 8.6–10.5)
CHLORIDE SERPL-SCNC: 101 MMOL/L (ref 98–107)
CHOLEST SERPL-MCNC: 236 MG/DL (ref 0–200)
CO2 SERPL-SCNC: 26.8 MMOL/L (ref 22–29)
CREAT SERPL-MCNC: 0.98 MG/DL (ref 0.76–1.27)
EGFRCR SERPLBLD CKD-EPI 2021: 96.3 ML/MIN/1.73
GLOBULIN UR ELPH-MCNC: 2.3 GM/DL
GLUCOSE SERPL-MCNC: 112 MG/DL (ref 65–99)
HDLC SERPL-MCNC: 47 MG/DL (ref 40–60)
LDLC SERPL CALC-MCNC: 170 MG/DL (ref 0–100)
LDLC/HDLC SERPL: 3.57 {RATIO}
POTASSIUM SERPL-SCNC: 3.6 MMOL/L (ref 3.5–5.2)
PROT SERPL-MCNC: 6.8 G/DL (ref 6–8.5)
SODIUM SERPL-SCNC: 140 MMOL/L (ref 136–145)
T4 FREE SERPL-MCNC: 1.16 NG/DL (ref 0.93–1.7)
TRIGL SERPL-MCNC: 105 MG/DL (ref 0–150)
TSH SERPL DL<=0.05 MIU/L-ACNC: 6.82 UIU/ML (ref 0.27–4.2)
VLDLC SERPL-MCNC: 19 MG/DL (ref 5–40)

## 2024-02-12 PROCEDURE — 84443 ASSAY THYROID STIM HORMONE: CPT

## 2024-02-12 PROCEDURE — 36415 COLL VENOUS BLD VENIPUNCTURE: CPT

## 2024-02-12 PROCEDURE — 84439 ASSAY OF FREE THYROXINE: CPT

## 2024-02-12 PROCEDURE — 80061 LIPID PANEL: CPT

## 2024-02-12 PROCEDURE — 80053 COMPREHEN METABOLIC PANEL: CPT

## 2024-03-13 ENCOUNTER — TRANSCRIBE ORDERS (OUTPATIENT)
Dept: ADMINISTRATIVE | Facility: HOSPITAL | Age: 49
End: 2024-03-13
Payer: COMMERCIAL

## 2024-03-13 DIAGNOSIS — R79.89 ABNORMAL LFTS: Primary | ICD-10-CM

## 2024-03-29 ENCOUNTER — HOSPITAL ENCOUNTER (OUTPATIENT)
Dept: ULTRASOUND IMAGING | Facility: HOSPITAL | Age: 49
Discharge: HOME | End: 2024-03-29
Admitting: FAMILY MEDICINE
Payer: COMMERCIAL

## 2024-03-29 DIAGNOSIS — R79.89 ABNORMAL LFTS: ICD-10-CM

## 2024-03-29 PROCEDURE — 76705 ECHO EXAM OF ABDOMEN: CPT

## 2024-05-27 ENCOUNTER — HOSPITAL ENCOUNTER (EMERGENCY)
Facility: HOSPITAL | Age: 49
Discharge: HOME | End: 2024-05-27
Attending: EMERGENCY MEDICINE | Admitting: EMERGENCY MEDICINE
Payer: COMMERCIAL

## 2024-05-27 VITALS
DIASTOLIC BLOOD PRESSURE: 92 MMHG | OXYGEN SATURATION: 94 % | RESPIRATION RATE: 20 BRPM | TEMPERATURE: 98.3 F | HEART RATE: 72 BPM | SYSTOLIC BLOOD PRESSURE: 148 MMHG

## 2024-05-27 DIAGNOSIS — M54.50 ACUTE RIGHT-SIDED LOW BACK PAIN WITHOUT SCIATICA: Primary | ICD-10-CM

## 2024-05-27 PROCEDURE — 99282 EMERGENCY DEPT VISIT SF MDM: CPT

## 2024-05-27 PROCEDURE — 96372 THER/PROPH/DIAG INJ SC/IM: CPT

## 2024-05-27 PROCEDURE — 25010000002 KETOROLAC TROMETHAMINE PER 15 MG

## 2024-05-27 PROCEDURE — 25010000002 ORPHENADRINE CITRATE PER 60 MG

## 2024-05-27 RX ORDER — KETOROLAC TROMETHAMINE 30 MG/ML
30 INJECTION, SOLUTION INTRAMUSCULAR; INTRAVENOUS ONCE
Status: COMPLETED | OUTPATIENT
Start: 2024-05-27 | End: 2024-05-27

## 2024-05-27 RX ORDER — PAROXETINE HYDROCHLORIDE 20 MG/1
1 TABLET, FILM COATED ORAL EVERY MORNING
COMMUNITY

## 2024-05-27 RX ORDER — KETOROLAC TROMETHAMINE 10 MG/1
10 TABLET, FILM COATED ORAL EVERY 6 HOURS PRN
Qty: 15 TABLET | Refills: 0 | Status: SHIPPED | OUTPATIENT
Start: 2024-05-27

## 2024-05-27 RX ORDER — CYCLOBENZAPRINE HCL 5 MG
5 TABLET ORAL 3 TIMES DAILY PRN
Qty: 21 TABLET | Refills: 0 | Status: SHIPPED | OUTPATIENT
Start: 2024-05-27

## 2024-05-27 RX ORDER — ESOMEPRAZOLE MAGNESIUM 20 MG/1
20 GRANULE, DELAYED RELEASE ORAL DAILY
COMMUNITY

## 2024-05-27 RX ORDER — ORPHENADRINE CITRATE 30 MG/ML
60 INJECTION INTRAMUSCULAR; INTRAVENOUS ONCE
Status: COMPLETED | OUTPATIENT
Start: 2024-05-27 | End: 2024-05-27

## 2024-05-27 RX ORDER — METOPROLOL SUCCINATE 25 MG/1
1 TABLET, EXTENDED RELEASE ORAL DAILY
COMMUNITY

## 2024-05-27 RX ORDER — LEVOTHYROXINE SODIUM 175 UG/1
1 TABLET ORAL DAILY
COMMUNITY

## 2024-05-27 RX ADMIN — ORPHENADRINE CITRATE 60 MG: 30 INJECTION, SOLUTION INTRAMUSCULAR; INTRAVENOUS at 10:42

## 2024-05-27 RX ADMIN — KETOROLAC TROMETHAMINE 30 MG: 30 INJECTION, SOLUTION INTRAMUSCULAR; INTRAVENOUS at 10:41

## 2024-05-27 NOTE — ED PROVIDER NOTES
Time: 10:30 AM EDT  Date of encounter:  5/27/2024  Independent Historian/Clinical History and Information was obtained by:   Patient    History is limited by: N/A    Chief Complaint: Low back pain      History of Present Illness:  Patient is a 48 y.o. year old male who presents to the emergency department for evaluation of low back pain.  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  He has been having spasms in his low back whenever he moves.  Denies any loss of bowel or bladder functions.  Denies any saddle anesthesia.  He does have a history of chronic low back pain and has been evaluated in pain management and neurosurgery.  He has had epidural steroid injections in the past.      HPI    Patient Care Team  Primary Care Provider: Provider, No Known    Past Medical History:     Allergies   Allergen Reactions    Penicillins Itching, Swelling and Rash     Past Medical History:   Diagnosis Date    Anxiety     COPD (chronic obstructive pulmonary disease)     Disease of thyroid gland     Essential hypertension     GERD (gastroesophageal reflux disease)     Irritable bowel syndrome     Rectal bleeding     Sleep apnea     CPAP    Vasovagal syncope      Past Surgical History:   Procedure Laterality Date    COLONOSCOPY  2013 2019    COLONOSCOPY N/A 9/23/2021    Procedure: COLONOSCOPY;  Surgeon: Alli Reid MD;  Location: [facility] ENDOSCOPY;  Service: Gastroenterology;  Laterality: N/A;    ENDOSCOPY  2013 2019    HERNIA REPAIR      KNEE SURGERY      KNEE REPAIR    THYROIDECTOMY      US GUIDED FINE NEEDLE ASPIRATION  10/17/2019    US GUIDED FINE NEEDLE ASPIRATION  10/17/2019     Family History   Problem Relation Age of Onset    Colon cancer Maternal Grandfather        Home Medications:  Prior to Admission medications    Medication Sig Start Date End Date Taking? Authorizing Provider   allopurinol (ZYLOPRIM) 100 MG tablet Take 100 mg by mouth Daily.  8/21/21   Solitario Guan MD   dicyclomine (BENTYL) 20 MG tablet TAKE 1 TABLET THREE TIMES A DAY AS NEEDED 12/12/22   Kamryn Larose APRN   esomeprazole (NexIUM) 20 MG packet Take 20 mg by mouth Daily.    Solitario Guan MD   fluticasone (FLONASE) 50 MCG/ACT nasal spray 1 spray into the nostril(s) as directed by provider Daily As Needed for Allergies.    Solitario Guan MD   hydrocortisone (ANUSOL-HC) 25 MG suppository Insert 1 suppository into the rectum 2 (Two) Times a Day As Needed for Hemorrhoids (rectal discomfort). 8/24/21   Kamryn Larose APRN   levothyroxine (Synthroid) 175 MCG tablet Take 1 tablet by mouth Daily.    Solitario Guan MD   levothyroxine (SYNTHROID, LEVOTHROID) 175 MCG tablet Take 175 mcg by mouth Every Morning. 8/2/21   Solitario Guan MD   lisinopril-hydrochlorothiazide (PRINZIDE,ZESTORETIC) 20-12.5 MG per tablet Take 1 tablet by mouth Every Morning. 6/30/21   Solitario Guan MD   metoprolol succinate XL (TOPROL-XL) 25 MG 24 hr tablet Take 25 mg by mouth Daily. 7/20/21   Solitario Guan MD   metoprolol succinate XL (TOPROL-XL) 25 MG 24 hr tablet Take 1 tablet by mouth Daily.    Solitario Guan MD   PARoxetine (PAXIL) 20 MG tablet Take 20 mg by mouth Every Morning. 7/30/21   Solitario Guan MD   PARoxetine (PAXIL) 20 MG tablet Take 1 tablet by mouth Every Morning.    Solitario Guan MD        Social History:   Social History     Tobacco Use    Smoking status: Never    Smokeless tobacco: Current     Types: Snuff   Vaping Use    Vaping status: Never Used   Substance Use Topics    Alcohol use: Yes     Comment: OCC DRINKS, LESS THAN 1DRINK PER DAY, HAS BEEN DRINKING FOR 11-20 YEARS     Drug use: Never         Review of Systems:  Review of Systems   Constitutional:  Negative for chills and fever.   HENT:  Negative for congestion and ear pain.    Eyes:  Negative for pain.   Respiratory:  Negative for cough and shortness of breath.     Cardiovascular:  Negative for chest pain.   Gastrointestinal:  Negative for abdominal pain, diarrhea, nausea and vomiting.   Genitourinary:  Negative for dysuria and hematuria.   Musculoskeletal:  Positive for back pain. Negative for myalgias.   Skin:  Negative for rash.   Neurological:  Negative for dizziness and headaches.        Physical Exam:  /92 (BP Location: Right arm, Patient Position: Sitting)   Pulse 72   Temp 98.3 °F (36.8 °C) (Oral)   Resp 20   SpO2 94%     Physical Exam  Constitutional:       Appearance: Normal appearance.   HENT:      Head: Normocephalic.   Eyes:      Extraocular Movements: Extraocular movements intact.      Conjunctiva/sclera: Conjunctivae normal.   Pulmonary:      Effort: Pulmonary effort is normal.   Abdominal:      General: There is no distension.   Musculoskeletal:      Comments: TTP to the right lumbar paraspinous muscles   Skin:     General: Skin is warm.      Coloration: Skin is not cyanotic.   Neurological:      Mental Status: He is alert and oriented to person, place, and time.   Psychiatric:         Attention and Perception: Attention and perception normal.         Mood and Affect: Mood normal.                  Procedures:  Procedures      Medical Decision Making:      Comorbidities that affect care:    None    External Notes reviewed:    None      The following orders were placed and all results were independently analyzed by me:  No orders of the defined types were placed in this encounter.      Medications Given in the Emergency Department:  Medications   ketorolac (TORADOL) injection 30 mg (30 mg Intramuscular Given 5/27/24 1041)   orphenadrine (NORFLEX) injection 60 mg (60 mg Intramuscular Given 5/27/24 1042)        ED Course:         Labs:    Lab Results (last 24 hours)       ** No results found for the last 24 hours. **             Imaging:    No Radiology Exams Resulted Within Past 24 Hours      Differential Diagnosis and Discussion:    Back Pain: The  patient presents with back pain. My differential diagnosis includes but is not limited to acute spinal epidural abscess, acute spinal epidural bleed, cauda equina syndrome, abdominal aortic aneurysm, aortic dissection, kidney stone, pyelonephritis, musculoskeletal back pain, spinal fracture, and osteoarthritis.         MDM  Risk of Complications, Morbidity, and/or Mortality  Presenting problems: moderate  Diagnostic procedures: low  Management options: low    Patient Progress  Patient progress: stable    The patient´s symptoms are consistent with musculoskeletal back pain. The patient is now resting comfortably, feels better, is alert, talkative, interactive and in no distress. The repeat examination is unremarkable and benign. The patient is neurologically intact and is ambulatory in the ED. The patient has no fever, no bowel or bladder incontinence, no saddle anesthesia, and is otherwise alert and well appearing. The history, physical exam, and diagnostics (if any) do not suggest the presence of acute spinal epidural abscess, acute spinal epidural bleed, cauda equina syndrome, abdominal aortic aneurysm, aortic dissection or other process requiring further testing, treatment or consultation in the emergency department. The vital signs have been stable. The patient's condition is stable and appropriate for discharge. The patient will pursue further outpatient evaluation with the primary care physician or other designated for consulting position as indicated in the discharge instructions.               Patient Care Considerations:    NARCOTICS: I considered prescribing opiate pain medication as an outpatient, however there are no acute bony injuries.  Patient is also not wanting any narcotics.      Consultants/Shared Management Plan:    None    Social Determinants of Health:    Patient is independent, reliable, and has access to care.       Disposition and Care Coordination:    Discharged: The patient is suitable and  stable for discharge with no need for consideration of admission.    I have explained the patient´s condition, diagnoses and treatment plan based on the information available to me at this time. I have answered questions and addressed any concerns. The patient has a good  understanding of the patient´s diagnosis, condition, and treatment plan as can be expected at this point. The vital signs have been stable. The patient´s condition is stable and appropriate for discharge from the emergency department.      The patient will pursue further outpatient evaluation with the primary care physician or other designated or consulting physician as outlined in the discharge instructions. They are agreeable to this plan of care and follow-up instructions have been explained in detail. The patient has received these instructions in written format and has expressed an understanding of the discharge instructions. The patient is aware that any significant change in condition or worsening of symptoms should prompt an immediate return to this or the closest emergency department or call to 911.  I have explained discharge medications and the need for follow up with the patient/caretakers. This was also printed in the discharge instructions. Patient was discharged with the following medications and follow up:      Medication List        New Prescriptions      cyclobenzaprine 5 MG tablet  Commonly known as: FLEXERIL  Take 1 tablet by mouth 3 (Three) Times a Day As Needed for Muscle Spasms.     ketorolac 10 MG tablet  Commonly known as: TORADOL  Take 1 tablet by mouth Every 6 (Six) Hours As Needed for Moderate Pain.               Where to Get Your Medications        These medications were sent to [id number] [place] [facility] [address] AT [facility] [address] - [phone]  - [phone]   [address] [place] [address]      Phone: [phone]   cyclobenzaprine 5 MG tablet  ketorolac  10 MG tablet      No follow-up provider specified.     Final diagnoses:   Acute right-sided low back pain without sciatica        ED Disposition       ED Disposition   Discharge    Condition   Stable    Comment   --               This medical record created using voice recognition software.             Dominic Rowland PA  05/27/24 1860

## 2024-05-27 NOTE — DISCHARGE INSTRUCTIONS
Your exam and symptoms are consistent with acute low back pain.  You are being discharged home with Toradol and Flexeril. Take your toradol with meals. Do not take with other NSAIDs such as advil and aleve. Take your flexeril at night first since this may cause drowsiness.    Follow up with your PCP in 5-7 days especially if symptoms persist.    Return to the Emergency Department if you develop any uncontrollable fever, intractable pain, nausea, vomiting.

## 2025-05-12 ENCOUNTER — OFFICE VISIT (OUTPATIENT)
Dept: ORTHOPEDIC SURGERY | Facility: CLINIC | Age: 50
End: 2025-05-12
Payer: COMMERCIAL

## 2025-05-12 VITALS
BODY MASS INDEX: 37.11 KG/M2 | HEART RATE: 63 BPM | DIASTOLIC BLOOD PRESSURE: 95 MMHG | HEIGHT: 73 IN | WEIGHT: 280 LBS | OXYGEN SATURATION: 93 % | SYSTOLIC BLOOD PRESSURE: 154 MMHG

## 2025-05-12 DIAGNOSIS — S49.92XA INJURY OF LEFT SHOULDER, INITIAL ENCOUNTER: ICD-10-CM

## 2025-05-12 DIAGNOSIS — M25.512 LEFT SHOULDER PAIN, UNSPECIFIED CHRONICITY: Primary | ICD-10-CM

## 2025-05-12 RX ORDER — DOXYCYCLINE HYCLATE 100 MG
100 TABLET ORAL
COMMUNITY
Start: 2025-05-07 | End: 2025-05-14

## 2025-05-12 RX ORDER — ALBUTEROL SULFATE 90 UG/1
2 INHALANT RESPIRATORY (INHALATION) EVERY 6 HOURS PRN
COMMUNITY
Start: 2025-05-07 | End: 2025-05-21

## 2025-05-13 NOTE — PROGRESS NOTES
"Chief Complaint  Initial Evaluation of the Left Shoulder     Subjective      Alli Craft presents to [facility] for initial evaluation of the left shoulder.  He injured his shoulder about a week ago and re injured a few days ago.  He has significant decrease ROM of the shoulder.  He has pain with external rotation and abduction.      Allergies   Allergen Reactions    Penicillins Itching, Swelling and Rash        Social History     Socioeconomic History    Marital status:    Tobacco Use    Smoking status: Never    Smokeless tobacco: Current     Types: Snuff   Vaping Use    Vaping status: Never Used   Substance and Sexual Activity    Alcohol use: Yes     Comment: OCC DRINKS, LESS THAN 1DRINK PER DAY, HAS BEEN DRINKING FOR 11-20 YEARS     Drug use: Never    Sexual activity: Defer        I reviewed the patient's chief complaint, history of present illness, review of systems, past medical history, surgical history, family history, social history, medications, and allergy list.     Review of Systems     Constitutional: Denies fevers, chills, weight loss  Cardiovascular: Denies chest pain, shortness of breath  Skin: Denies rashes, acute skin changes  Neurologic: Denies headache, loss of consciousness      Vital Signs:   /95   Pulse 63   Ht 185.4 cm (73\")   Wt 127 kg (280 lb)   SpO2 93%   BMI 36.94 kg/m²          Physical Exam  General: Alert. No acute distress    Ortho Exam        LEFT SHOULDER Weakness with forward flexion and empty can testing.  Decrease ROM of the left shoulder.  Positive Cross body adduction. Supraspinatus strength 3/5. Infraspinatus Strength 3+/5. Infrared subscap 3+/5.  Sensation intact to light touch, median, radial, ulnar nerve. Positive AIN, PIN, ulnar nerve motor. Positive pulses. Positive Impingement signs.Tender to palpation to the lateral aspect of the shoulder and down the arm.         Procedures        Imaging Results (Most Recent)       " Procedure Component Value Units Date/Time    XR Scapula Left [[id number]] Resulted: 05/12/25 1534     Updated: 05/12/25 1537             Result Review :     X-Ray Report:  Left scapula X-Ray  Indication: Evaluation of the left scapula  AP/Lateral view(s)  Findings: Ac joint arthrosis.    Prior studies available for comparison: no        Assessment and Plan     Diagnoses and all orders for this visit:    1. Left shoulder pain, unspecified chronicity (Primary)  -     XR Scapula Left  -     MRI Shoulder Left Without Contrast; Future    2. Injury of left shoulder, initial encounter        Discussed the treatment plan with the patient. I reviewed the X-rays that were obtained today with the patient.     MRI of the left shoulder to assess the structure.          Educated on risk of smoking/nicotine. Discussed options for smoking cessation regarding chantix, nicorette gum and/ or to call the quit hotline at [phone]  and Call or return if worsening symptoms.    Follow Up     After MRI of the left shoulder.       Patient was given instructions and counseling regarding his condition or for health maintenance advice. Please see specific information pulled into the AVS if appropriate.     Transcribed for Jevon Camacho MD by Jannette Hinkle MA.  05/13/25   17:07 EDT      I have personally performed the services described in this document as scribed by the above individual and it is both accurate and complete. Jevon Camacho MD 05/14/25

## 2025-06-05 ENCOUNTER — HOSPITAL ENCOUNTER (OUTPATIENT)
Dept: MRI IMAGING | Facility: HOSPITAL | Age: 50
Discharge: HOME | End: 2025-06-05
Admitting: ORTHOPAEDIC SURGERY
Payer: COMMERCIAL

## 2025-06-05 DIAGNOSIS — M25.512 LEFT SHOULDER PAIN, UNSPECIFIED CHRONICITY: ICD-10-CM

## 2025-06-05 PROCEDURE — 73221 MRI JOINT UPR EXTREM W/O DYE: CPT

## 2025-06-27 ENCOUNTER — OFFICE VISIT (OUTPATIENT)
Dept: ORTHOPEDIC SURGERY | Facility: CLINIC | Age: 50
End: 2025-06-27
Payer: COMMERCIAL

## 2025-06-27 VITALS — OXYGEN SATURATION: 98 % | SYSTOLIC BLOOD PRESSURE: 149 MMHG | HEART RATE: 77 BPM | DIASTOLIC BLOOD PRESSURE: 84 MMHG

## 2025-06-27 DIAGNOSIS — M25.512 LEFT SHOULDER PAIN, UNSPECIFIED CHRONICITY: Primary | ICD-10-CM

## 2025-06-27 DIAGNOSIS — S46.211A RUPTURE OF RIGHT PROXIMAL BICEPS TENDON, INITIAL ENCOUNTER: ICD-10-CM

## 2025-06-27 PROCEDURE — 99213 OFFICE O/P EST LOW 20 MIN: CPT | Performed by: ORTHOPAEDIC SURGERY

## 2025-06-27 NOTE — PROGRESS NOTES
Chief Complaint  Pain and Follow-up of the Left Shoulder (mri)       Subjective      Alli Craft presents to [facility] for a follow up for his left shoulder. He was last seen in the office on 05/12/25 where we ordered an MRI on his left shoulder. He presents to the office today for these results. He states he is doing some better since his initial injury which was about 6 - 8 weeks ago. He is still having some cramps and weakness to his muscle.     Allergies   Allergen Reactions    Penicillins Itching, Swelling and Rash        Social History     Socioeconomic History    Marital status:    Tobacco Use    Smoking status: Never    Smokeless tobacco: Current     Types: Snuff   Vaping Use    Vaping status: Never Used   Substance and Sexual Activity    Alcohol use: Yes     Comment: OCC DRINKS, LESS THAN 1DRINK PER DAY, HAS BEEN DRINKING FOR 11-20 YEARS     Drug use: Never    Sexual activity: Defer        I reviewed the patient's chief complaint, history of present illness, review of systems, past medical history, surgical history, family history, social history, medications, and allergy list.     Review of Systems     Constitutional: Denies fevers, chills, weight loss  Cardiovascular: Denies chest pain, shortness of breath  Skin: Denies rashes, acute skin changes  Neurologic: Denies headache, loss of consciousness  MSK: left shoulder pain       Vital Signs:   /84   Pulse 77   SpO2 98%          Physical Exam  General: Alert. No acute distress    Ortho Exam        Left upper extremity: Weakness with forward flexion and empty can testing.  Decrease ROM of the left shoulder.  Positive Cross body adduction. Supraspinatus strength 3/5. Infraspinatus Strength 3+/5. Infrared subscap 3+/5.  Sensation intact to light touch, median, radial, ulnar nerve. Positive AIN, PIN, ulnar nerve motor. Positive pulses. Positive Impingement signs.Tender to palpation to the lateral aspect of the  shoulder and down the arm.         Procedures        Imaging Results (Most Recent)       None             Result Review :       US Fibroscan  Result Date: 2025  Narrative: REVIEWING YOUR TEST RESULTS IN [facility] NOT A SUB[facility] FOR DISCUSSING THOSE RESULTS WITH YOUR HEALTH CARE PROVIDER. PLEASE CONTACT YOUR PROVIDER [facility] TO DISCUSS ANY QUESTIONS OR CONCERNS YOU MAY HAVE REGARDING THESE TEST RESULTS.  RADIOLOGY REPORT FACILITY:  [facility] UNIT/AGE/GENDER: GERMAN  OP      AGE:49 Y          SEX:M PATIENT NAME/:  SARA DIAZ R    1975 UNIT NUMBER:  [id number] ACCOUNT NUMBER:  [id number] ACCESSION NUMBER:  [id number] Fibroscan (Liver elastography without imaging) on 2025 2:58 PM INDICATION: hepatic steatosis. TECHNIQUE: Liver elastography performed via mechanically-induced shear wave technique, without imaging. At least 10 measurements were performed,  with all reviewed by  and physician for technical accuracy, with goal variability as quantified by IQR/median percentage of less than 30%. FINDINGS: Hepatic parenchymal stiffness (measure of elasticity) : 6.7  kPa, with  IQR/Median percentage of  16 % Hepatic controlled attenuation parameter (CAP, measure of ultrasound attenuation: 351  dB/M IMPRESSION: 1.  Hepatic fibrosis grade (scale F0-F4):  F0-F1. Normal to mild fibrosis. 2.  Hepatic steatosis grade (scale S0-S3):  S3. Severe steatosis. Fibrosis grading criteria (in kPa): DISEASEF0-F1 Normal to Mild FibrosisF2 Moderate FibrosisF3 Significant FibrosisF4 Cirrhosis HBV<6.0>6.0>9.0>12.0 HCV<7.0>7.0>9.5>12.0 HCV-HIV<7.0<10>11.0>14.0 CHOLESTATIC<7.0>7.5>10.0>17.0 NAFLD/POWELL<7.0>7.5>10.0>14.0 Alcohol related<7.0>7.0>11.0>19.0 Steatosis grading criteria (in dB/M) GRADECAP CUTOFF S0  No steatosis<248 S1  Mild [id number] S2  Moderate [id number] S3  Severe steatosis>281 Dictated by: Yefri Blunt D.O. Images and Report reviewed and interpreted by:  Yefri Blunt D.O. <PS><Electronically signed by: Yefri Blunt D.O.> 06/20/2025 1535 D: 06/20/2025 1534 T: 06/20/2025 1534    MRI Shoulder Left Without Contrast  Result Date: 6/9/2025  Narrative: MRI SHOULDER LEFT WO CONTRAST Date of Exam: 6/5/2025 10:02 AM EDT Indication: left shoulder pain. Pulling injury in May.  Comparison: Shoulder x-ray 5/12/2025 Technique:  Routine multiplanar/multisequence images of the left shoulder were obtained without contrast administration.  Findings: Motion limits evaluation. Rotator cuff: Supraspinatus tendon: Mild to moderate distal tendinosis. No significant muscle atrophy. Infraspinatus tendon: Mild to moderate distal tendinosis. No significant muscle atrophy. Subscapularis tendon: No tendon abnormality. No significant muscle atrophy. Teres minor tendon: No tendon abnormality. No significant muscle atrophy. Glenohumeral joint: Humeral head is centrally located within the glenoid. Physiologic joint fluid is present. Mild to moderate joint space narrowing and cartilage loss. The inferior joint capsule is normal thickness and normal signal intensity. Labrum: There is tear of the superior labrum from anterior to posterior. No paralabral cysts. Biceps tendon: The long head biceps tendon is torn from its attachment on the glenoid labrum. It is slightly retracted inferiorly. The retracted portion is seen at the inferior field-of-view. The tendon is thickened and has increased signal intensity. The retraction measures about 5.2 cm. Acromioclavicular Joint: Moderate degenerative change.. No abnormal bone marrow edema. No os acromiale. No significant lateral downsloping of the acromion. Bone: No fractures or aggressive osseous lesions. Bone marrow signal intensity is normal. Miscellaneous: No significant subacromial subdeltoid fluid. No pathologically enlarged axillary lymph nodes. Deltoid muscle has normal size and signal intensity.     Impression: 1.There is complete tearing of  the long head biceps tendon from its attachment on the glenoid labrum. It is retracted inferiorly. 2.There is mild to moderate arthritis glenohumeral joint with tear of the superior labrum from anterior posterior. 3.There is mild to moderate tendinosis of the distal supraspinatus and infraspinatus tendons. 4.There is moderate arthritis of the acromioclavicular joint. Electronically Signed: Madonna Shipman MD  6/9/2025 10:28 AM EDT  Workstation ID: [id number]             Assessment and Plan     Diagnoses and all orders for this visit:    1. Left shoulder pain, unspecified chronicity (Primary)    2. Partial tear of right proximal biceps tendon, initial encounter      The patient presents here today for a follow up for his left shoulder. MRI results was discussed and reviewed with the patient today in the office.     Order placed today for physical therapy and will continue over the counter medications for pain control.     Call or return if worsening symptoms.    Follow Up     PRN     Patient was given instructions and counseling regarding his condition or for health maintenance advice. Please see specific information pulled into the AVS if appropriate.     TransScribed for Jevon Camacho MD by Susie Dyer.  06/27/25   15:50 EDT      I have personally performed the services described in this document as scribed by the above individual and it is both accurate and complete. Jevon Camacho MD 06/30/25

## 2025-07-10 ENCOUNTER — HOSPITAL ENCOUNTER (OUTPATIENT)
Dept: OTHER | Facility: HOSPITAL | Age: 50
Discharge: HOME | End: 2025-07-10

## 2025-07-16 ENCOUNTER — HOSPITAL ENCOUNTER (EMERGENCY)
Facility: HOSPITAL | Age: 50
Discharge: HOME | End: 2025-07-16
Attending: EMERGENCY MEDICINE | Admitting: EMERGENCY MEDICINE
Payer: COMMERCIAL

## 2025-07-16 VITALS
BODY MASS INDEX: 37.11 KG/M2 | OXYGEN SATURATION: 98 % | HEIGHT: 73 IN | TEMPERATURE: 97.5 F | DIASTOLIC BLOOD PRESSURE: 93 MMHG | WEIGHT: 279.98 LBS | SYSTOLIC BLOOD PRESSURE: 147 MMHG | RESPIRATION RATE: 18 BRPM | HEART RATE: 63 BPM

## 2025-07-16 DIAGNOSIS — R20.2 PARESTHESIAS: ICD-10-CM

## 2025-07-16 DIAGNOSIS — M54.12 CERVICAL RADICULOPATHY: Primary | ICD-10-CM

## 2025-07-16 DIAGNOSIS — M54.2 NECK PAIN: ICD-10-CM

## 2025-07-16 PROCEDURE — 96372 THER/PROPH/DIAG INJ SC/IM: CPT

## 2025-07-16 PROCEDURE — 25010000002 KETOROLAC TROMETHAMINE PER 15 MG

## 2025-07-16 PROCEDURE — 97110 THERAPEUTIC EXERCISES: CPT | Performed by: PHYSICAL THERAPIST

## 2025-07-16 PROCEDURE — 25010000002 DEXAMETHASONE SODIUM PHOSPHATE 10 MG/ML SOLUTION

## 2025-07-16 PROCEDURE — 25010000002 ORPHENADRINE CITRATE PER 60 MG

## 2025-07-16 PROCEDURE — 99283 EMERGENCY DEPT VISIT LOW MDM: CPT

## 2025-07-16 PROCEDURE — 97161 PT EVAL LOW COMPLEX 20 MIN: CPT | Performed by: PHYSICAL THERAPIST

## 2025-07-16 RX ORDER — CYCLOBENZAPRINE HCL 10 MG
10 TABLET ORAL 3 TIMES DAILY PRN
Qty: 20 TABLET | Refills: 0 | Status: SHIPPED | OUTPATIENT
Start: 2025-07-16 | End: 2025-07-23

## 2025-07-16 RX ORDER — KETOROLAC TROMETHAMINE 10 MG/1
10 TABLET, FILM COATED ORAL EVERY 6 HOURS PRN
Qty: 15 TABLET | Refills: 0 | Status: SHIPPED | OUTPATIENT
Start: 2025-07-16 | End: 2025-07-23

## 2025-07-16 RX ORDER — LIDOCAINE 50 MG/G
1 PATCH TOPICAL EVERY 24 HOURS
Qty: 14 EACH | Refills: 0 | Status: SHIPPED | OUTPATIENT
Start: 2025-07-16

## 2025-07-16 RX ORDER — KETOROLAC TROMETHAMINE 30 MG/ML
60 INJECTION, SOLUTION INTRAMUSCULAR; INTRAVENOUS ONCE
Status: COMPLETED | OUTPATIENT
Start: 2025-07-16 | End: 2025-07-16

## 2025-07-16 RX ORDER — PREDNISONE 20 MG/1
20 TABLET ORAL DAILY
Qty: 5 TABLET | Refills: 0 | Status: SHIPPED | OUTPATIENT
Start: 2025-07-16 | End: 2025-07-21

## 2025-07-16 RX ORDER — LIDOCAINE 4 G/G
1 PATCH TOPICAL ONCE
Status: DISCONTINUED | OUTPATIENT
Start: 2025-07-16 | End: 2025-07-16 | Stop reason: HOSPADM

## 2025-07-16 RX ORDER — DEXAMETHASONE SODIUM PHOSPHATE 10 MG/ML
10 INJECTION, SOLUTION INTRAMUSCULAR; INTRAVENOUS ONCE
Status: COMPLETED | OUTPATIENT
Start: 2025-07-16 | End: 2025-07-16

## 2025-07-16 RX ORDER — ORPHENADRINE CITRATE 30 MG/ML
60 INJECTION INTRAMUSCULAR; INTRAVENOUS ONCE
Status: COMPLETED | OUTPATIENT
Start: 2025-07-16 | End: 2025-07-16

## 2025-07-16 RX ADMIN — ORPHENADRINE CITRATE 60 MG: 60 INJECTION INTRAMUSCULAR; INTRAVENOUS at 14:35

## 2025-07-16 RX ADMIN — LIDOCAINE 1 PATCH: 4 PATCH TOPICAL at 14:35

## 2025-07-16 RX ADMIN — KETOROLAC TROMETHAMINE 60 MG: 30 INJECTION, SOLUTION INTRAMUSCULAR at 14:35

## 2025-07-16 RX ADMIN — DEXAMETHASONE SODIUM PHOSPHATE 10 MG: 10 INJECTION INTRAMUSCULAR; INTRAVENOUS at 14:35

## 2025-07-16 NOTE — ED PROVIDER NOTES
Time: 2:27 PM EDT  Date of encounter:  7/16/2025  Independent Historian/Clinical History and Information was obtained by:   Patient    History is limited by: N/A    Chief Complaint: Neck pain      History of Present Illness:  Patient is a 49 y.o. year old male who presents to the emergency department for evaluation of neck pain that is chronic in nature but worsening over the last 2 weeks.  Patient reports he was seen at [facility] last Thursday and had x-rays completed at that time.  Reports he has since been scheduled for an MRI to be completed on August 8.  Patient reports pain radiating into the right upper extremity with paresthesias of the right 1st through 3rd digits.  Denies any recent injuries.  Report pain is worsened with extension of the neck.      Patient Care Team  Primary Care Provider: Natalia Blakely MD    Past Medical History:     Allergies   Allergen Reactions    Penicillins Itching, Swelling and Rash     Past Medical History:   Diagnosis Date    Anxiety     COPD (chronic obstructive pulmonary disease)     Disease of thyroid gland     Essential hypertension     GERD (gastroesophageal reflux disease)     Irritable bowel syndrome     Rectal bleeding     Sleep apnea     CPAP    Vasovagal syncope      Past Surgical History:   Procedure Laterality Date    COLONOSCOPY  2013 2019    COLONOSCOPY N/A 9/23/2021    Procedure: COLONOSCOPY;  Surgeon: Alli Reid MD;  Location: [facility] ENDOSCOPY;  Service: Gastroenterology;  Laterality: N/A;    ENDOSCOPY  2013 2019    HERNIA REPAIR      KNEE SURGERY      KNEE REPAIR    THYROIDECTOMY      US GUIDED FINE NEEDLE ASPIRATION  10/17/2019    US GUIDED FINE NEEDLE ASPIRATION  10/17/2019     Family History   Problem Relation Age of Onset    Colon cancer Maternal Grandfather        Home Medications:  Prior to Admission medications    Medication Sig Start Date End Date Taking? Authorizing Provider   allopurinol (ZYLOPRIM) 100 MG tablet Take 1 tablet  by mouth Daily.  Patient not taking: Reported on 5/12/2025 8/21/21   Solitario Guan MD   cyclobenzaprine (FLEXERIL) 5 MG tablet Take 1 tablet by mouth 3 (Three) Times a Day As Needed for Muscle Spasms.  Patient not taking: Reported on 5/12/2025 5/27/24   Dominic Rowland PA   dicyclomine (BENTYL) 20 MG tablet TAKE 1 TABLET THREE TIMES A DAY AS NEEDED  Patient not taking: Reported on 5/12/2025 12/12/22   Kamryn Larose APRN   esomeprazole (NexIUM) 20 MG packet Take 20 mg by mouth Daily.  Patient not taking: Reported on 5/12/2025    Solitario Guan MD   fluticasone (FLONASE) 50 MCG/ACT nasal spray 1 spray into the nostril(s) as directed by provider Daily As Needed for Allergies.  Patient not taking: Reported on 5/12/2025    Solitario Guan MD   hydrocortisone (ANUSOL-HC) 25 MG suppository Insert 1 suppository into the rectum 2 (Two) Times a Day As Needed for Hemorrhoids (rectal discomfort).  Patient not taking: Reported on 5/12/2025 8/24/21   Kamryn Larose APRN   levothyroxine (Synthroid) 175 MCG tablet Take 1 tablet by mouth Daily. 7/29/24   Thompson Tadeo DO   levothyroxine (SYNTHROID, LEVOTHROID) 175 MCG tablet Take 1 tablet by mouth Every Morning.  Patient not taking: Reported on 5/12/2025 8/2/21   Solitario Guan MD   lisinopril-hydrochlorothiazide (PRINZIDE,ZESTORETIC) 20-12.5 MG per tablet Take 1 tablet by mouth Every Morning. 7/29/24   Thompson Tadeo DO   metoprolol succinate XL (TOPROL-XL) 25 MG 24 hr tablet Take 1 tablet by mouth Daily. 7/29/24   Thompson Tadeo DO   PARoxetine (PAXIL) 20 MG tablet Take 1 tablet by mouth Every Morning. 7/29/24   Thompson Tadeo DO        Social History:   Social History     Tobacco Use    Smoking status: Never    Smokeless tobacco: Current     Types: Snuff   Vaping Use    Vaping status: Never Used   Substance Use Topics    Alcohol use: Yes     Comment: OCC DRINKS, LESS THAN 1DRINK PER DAY, HAS BEEN DRINKING FOR 11-20 YEARS     Drug use:  "Never         Review of Systems:  Review of Systems   Constitutional:  Negative for chills, fatigue and fever.   HENT:  Negative for ear pain, rhinorrhea and sore throat.    Eyes:  Negative for visual disturbance.   Respiratory:  Negative for cough and shortness of breath.    Cardiovascular:  Negative for chest pain.   Gastrointestinal:  Negative for abdominal pain, diarrhea and vomiting.   Genitourinary:  Negative for difficulty urinating.   Musculoskeletal:  Positive for neck pain. Negative for arthralgias, back pain and myalgias.   Skin:  Negative for rash.   Neurological:  Negative for light-headedness and headaches.   Hematological:  Negative for adenopathy.   Psychiatric/Behavioral: Negative.          Physical Exam:  /93   Pulse 63   Temp 97.5 °F (36.4 °C)   Resp 18   Ht 185.4 cm (73\")   Wt 127 kg (279 lb 15.8 oz)   SpO2 98%   BMI 36.94 kg/m²     Physical Exam  Vitals and nursing note reviewed.   Constitutional:       General: He is not in acute distress.     Appearance: Normal appearance. He is not toxic-appearing.   HENT:      Head: Normocephalic and atraumatic.      Nose: Nose normal.      Mouth/Throat:      Mouth: Mucous membranes are moist.   Eyes:      Conjunctiva/sclera: Conjunctivae normal.   Cardiovascular:      Rate and Rhythm: Normal rate.      Pulses: Normal pulses.   Pulmonary:      Effort: Pulmonary effort is normal.   Musculoskeletal:         General: No tenderness. Normal range of motion.      Cervical back: Normal range of motion.   Skin:     General: Skin is warm and dry.   Neurological:      General: No focal deficit present.      Mental Status: He is alert and oriented to person, place, and time.   Psychiatric:         Mood and Affect: Mood normal.         Behavior: Behavior normal.         Thought Content: Thought content normal.         Judgment: Judgment normal.                    Medical Decision Making:      Comorbidities that affect care:    COPD, Hypertension    External " Notes reviewed:    None      The following orders were placed and all results were independently analyzed by me:  Orders Placed This Encounter   Procedures    Ambulatory Referral to Physical Therapy for Evaluation & Treatment    PT Consult: Eval & Treat Functional Mobility Below Baseline    PT Plan of Care Cert / Re-Cert       Medications Given in the Emergency Department:  Medications   Lidocaine 4 % 1 patch (1 patch Transdermal Medication Applied 7/16/25 1435)   ketorolac (TORADOL) injection 60 mg (60 mg Intramuscular Given 7/16/25 1435)   orphenadrine (NORFLEX) injection 60 mg (60 mg Intramuscular Given 7/16/25 1435)   dexAMETHasone sodium phosphate injection 10 mg (10 mg Intramuscular Given 7/16/25 1435)        ED Course:         Labs:    Lab Results (last 24 hours)       ** No results found for the last 24 hours. **             Imaging:    No Radiology Exams Resulted Within Past 24 Hours      Differential Diagnosis and Discussion:    Neck Pain: The patient presents with neck pain. My differential diagnosis includes but is not limited to acute spinal epidural abscess, acute spinal epidural bleed, meningitis, musculoskeletal neck pain, spinal fracture, and osteoarthritis.     PROCEDURES:        No orders to display       Procedures    MDM  Number of Diagnoses or Management Options  Cervical radiculopathy  Neck pain  Paresthesias  Diagnosis management comments: I have explained the patient´s condition, diagnoses and treatment plan based on the information available to me at this time. I have answered questions and addressed any concerns. The patient has a good  understanding of the patient´s diagnosis, condition, and treatment plan as can be expected at this point. The vital signs have been stable. The patient´s condition is stable and appropriate for discharge from the emergency department.      The patient will pursue further outpatient evaluation with the primary care physician or other designated or consulting  physician as outlined in the discharge instructions. They are agreeable to this plan of care and follow-up instructions have been explained in detail. The patient has received these instructions in written format and has expressed an understanding of the discharge instructions. The patient is aware that any significant change in condition or worsening of symptoms should prompt an immediate return to this or the closest emergency department or call to 911.                        Patient Care Considerations:    Consider T-spine x-ray however patient had x-rays completed 5 days ago at urgent care.  No injury since that time.      Consultants/Shared Management Plan:    SHARED VISIT: I have discussed the case with my supervising physician, Dr. Carnes who states he agrees with treatment plan. The substantive portion of the medical decision was made by the attesting physician who made or approve the management plan and will take responsibility for the patient.  Clinical findings were discussed and ultimate disposition was made in consult with supervising physician.    Social Determinants of Health:    Patient is independent, reliable, and has access to care.       Disposition and Care Coordination:    Discharged: The patient is suitable and stable for discharge with no need for consideration of admission.    I have explained the patient´s condition, diagnoses and treatment plan based on the information available to me at this time. I have answered questions and addressed any concerns. The patient has a good  understanding of the patient´s diagnosis, condition, and treatment plan as can be expected at this point. The vital signs have been stable. The patient´s condition is stable and appropriate for discharge from the emergency department.      The patient will pursue further outpatient evaluation with the primary care physician or other designated or consulting physician as outlined in the discharge instructions. They are  agreeable to this plan of care and follow-up instructions have been explained in detail. The patient has received these instructions in written format and has expressed an understanding of the discharge instructions. The patient is aware that any significant change in condition or worsening of symptoms should prompt an immediate return to this or the closest emergency department or call to 911.  I have explained discharge medications and the need for follow up with the patient/caretakers. This was also printed in the discharge instructions. Patient was discharged with the following medications and follow up:      Medication List        New Prescriptions      ketorolac 10 MG tablet  Commonly known as: TORADOL  Take 1 tablet by mouth Every 6 (Six) Hours As Needed for Moderate Pain.     lidocaine 5 %  Commonly known as: LIDODERM  Place 1 patch on the skin as directed by provider Daily. Remove & Discard patch within 12 hours or as directed by MD     predniSONE 20 MG tablet  Commonly known as: DELTASONE  Take 1 tablet by mouth Daily for 5 days.            Changed      cyclobenzaprine 10 MG tablet  Commonly known as: FLEXERIL  Take 1 tablet by mouth 3 (Three) Times a Day As Needed for Muscle Spasms.  What changed:   medication strength  how much to take               Where to Get Your Medications        These medications were sent to [id number] - [place], [place] - [address]  AT [facility] [address] [address] - [phone]  - [phone]   [address] [facility] [address]      Phone: [phone]   cyclobenzaprine 10 MG tablet  ketorolac 10 MG tablet  lidocaine 5 %  predniSONE 20 MG tablet      [facility]  [address]  [facility] [address] [phone]        Natalia Blakely MD  [address]  [address]  [facility] [id number] [phone]    Go in 1 week         Final diagnoses:   Cervical radiculopathy   Neck pain    Paresthesias        ED Disposition       ED Disposition   Discharge    Condition   Stable    Comment   --               This medical record created using voice recognition software.             Aria Matamoros, APRN  07/16/25 7166

## 2025-07-16 NOTE — DISCHARGE INSTRUCTIONS
Please keep your scheduled follow-up appointment for an MRI of your C-spine.  You have been set up to see physical therapy beginning tomorrow.  Please continue taking your gabapentin as previously prescribed.  You have also been prescribed an anti-inflammatory pain medication, lidocaine patches, steroids and a muscle relaxer to  at your pharmacy today and begin taking.  Return to the ED for any new or worsening concerning symptoms.

## 2025-07-16 NOTE — ED PROVIDER NOTES
"SHARED VISIT ATTESTATION:    This visit was performed by myself and an APC.  I personally approved the management plan/medical decision making and take responsibility for the patient management.      SHARED VISIT NOTE:    Patient is 49 y.o. year old male that presents to the ED for evaluation of neck pain.     Physical Exam    ED Course:    /94 (BP Location: Right arm, Patient Position: Sitting)   Pulse 67   Temp 98 °F (36.7 °C) (Oral)   Resp 20   Ht 185.4 cm (73\")   Wt 127 kg (279 lb 15.8 oz)   SpO2 98%   BMI 36.94 kg/m²       The following orders were placed and all results were independently analyzed by me:  Orders Placed This Encounter   Procedures    Ambulatory Referral to Physical Therapy for Evaluation & Treatment       Medications Given in the Emergency Department:  Medications   Lidocaine 4 % 1 patch (1 patch Transdermal Medication Applied 7/16/25 1435)   ketorolac (TORADOL) injection 60 mg (60 mg Intramuscular Given 7/16/25 1435)   orphenadrine (NORFLEX) injection 60 mg (60 mg Intramuscular Given 7/16/25 1435)   dexAMETHasone sodium phosphate injection 10 mg (10 mg Intramuscular Given 7/16/25 1435)        ED Course:         Labs:    Lab Results (last 24 hours)       ** No results found for the last 24 hours. **             Imaging:    No Radiology Exams Resulted Within Past 24 Hours    MDM:    Procedures                         Joya Welsh MD  15:01 EDT  07/16/25         Joya Welsh MD  07/16/25 1501    "

## 2025-07-16 NOTE — THERAPY EVALUATION
Patient Name: Alli Craft  : 1975    MRN: [id number]                              Today's Date: 2025       Admit Date: 2025    Visit Dx:     ICD-10-CM ICD-9-CM   1. Cervical radiculopathy  M54.12 723.4   2. Neck pain  M54.2 723.1   3. Paresthesias  R20.2 782.0     Patient Active Problem List   Diagnosis    Hematochezia    Loose stools    Family history of colon cancer    Family history of colon cancer in father     Past Medical History:   Diagnosis Date    Anxiety     COPD (chronic obstructive pulmonary disease)     Disease of thyroid gland     Essential hypertension     GERD (gastroesophageal reflux disease)     Irritable bowel syndrome     Rectal bleeding     Sleep apnea     CPAP    Vasovagal syncope      Past Surgical History:   Procedure Laterality Date    COLONOSCOPY  2013    COLONOSCOPY N/A 2021    Procedure: COLONOSCOPY;  Surgeon: Alli Reid MD;  Location: [facility] ENDOSCOPY;  Service: Gastroenterology;  Laterality: N/A;    ENDOSCOPY  2013    HERNIA REPAIR      KNEE SURGERY      KNEE REPAIR    THYROIDECTOMY      US GUIDED FINE NEEDLE ASPIRATION  10/17/2019    US GUIDED FINE NEEDLE ASPIRATION  10/17/2019      General Information       Row Name 25 1610          Physical Therapy Time and Intention    Document Type evaluation  -LR     Mode of Treatment individual therapy  -LR       Row Name 25 1610          General Information    Patient Profile Reviewed yes  -LR     Prior Level of Function independent:  -LR               User Key  (r) = Recorded By, (t) = Taken By, (c) = Cosigned By      Initials Name Provider Type    LR Katlyn Dimas, PT Physical Therapist                  History: Pt reports he has had pain in his neck and down his right arm for two weeks now with no known injury.  He reports numbness in fingers 1-3 on the right.  Pain is a 6/10.  He did see his PCP and they did an x-ray of his neck which showed degeneration at C5/6.  He is RHD.  Pt  has an MRI scheduled for 25.     Objective:  Posture: forward head    Palpation: No tenderness to palpation surrounding cervical or thoracic spine    ROM:  Active Cervical ROM:  Flexion: WNL  Extension: 10°  L Side bending: WNL  R Side bendin°  L Rotation: WNL  R Rotation: 30°    Active Shoulder ROM: WNL B     Strength:  L Shoulder MMT:   R Shoulder MMT:  Flexion: 5   Flexion: 4  Abduction: 5   Abduction: 5    L Elbow MMT:   R Elbow MMT:  Flexion: 5   Flexion: 5  Extension: 5   Extension: 5    L Wrist MMT:    R Wrist MMT:  Flexion: 5   Flexion: 5  Extension: 5   Extension: 5    Decreased B  strength    Special Tests:  Spurlings: positive on R, negative on L  Cervical Distraction Test: positive     Sensation: B UE sensation intact to light touch, numbness in digits 1-3 on R    Assessment/Plan:   Pt presents with a diagnosis of neck pain and R arm pain and has signs/symptoms consistent with cervical radiculopathy with decreased cervical ROM and R median nerve tension that are limiting his ability to sleep and perform functional activities.  The patient was educated in exercises to perform at home to improve mobility and pain and was provided with a HEP handout.  He will follow up in outpatient PT tomorrow (25) at 9 a.m.     Goals:   LTG 1: The patient will be independent in HEP in order to decrease pain and improve tolerance to functional activities.  STATUS: Met    Interventions:   Manual Therapy: not performed    Therapeutic Exercises: HEP: chin tuck, median nerve glide (5x), cervical rotation AAROM with towel to the R (3x5 seconds), levator stretch (2x20 seconds R), seated thoracic extension    Modalities: not performed      Outcome Measures       Row Name 25 [id number]          Optimal Instrument    Optimal Instrument Optimal - 3  -LR     Lying Flat 2  -LR     Reaching 2  -LR     Lifting 2  -LR     From the list, choose the 3 activities you would most like to be able to do without any difficulty  Reaching;Lying flat;Lifting  -LR     Total Score Optimal - 3 6  -LR       Row Name 07/16/25 1610          Functional Assessment    Outcome Measure Options Optimal Instrument  -LR               User Key  (r) = Recorded By, (t) = Taken By, (c) = Cosigned By      Initials Name Provider Type    Katlyn An, PT Physical Therapist                     Time Calculation:   PT Evaluation Complexity  History, PT Evaluation Complexity: 3 or more personal factors and/or comorbidities  Examination of Body Systems (PT Eval Complexity): 1-2 elements  Clinical Presentation (PT Evaluation Complexity): stable  Clinical Decision Making (PT Evaluation Complexity): low complexity  Overall Complexity (PT Evaluation Complexity): low complexity     PT Charges       Row Name 07/16/25 1621             Time Calculation    PT Received On 07/16/25  -LR         Timed Charges    94673 - PT Therapeutic Exercise Minutes 10  -LR         Untimed Charges    PT Eval/Re-eval Minutes 31  -LR         Total Minutes    Timed Charges Total Minutes 10  -LR      Untimed Charges Total Minutes 31  -LR       Total Minutes 41  -LR                User Key  (r) = Recorded By, (t) = Taken By, (c) = Cosigned By      Initials Name Provider Type    Katlyn An, PT Physical Therapist                  Therapy Charges for Today       Code Description Service Date Service Provider Modifiers Qty    38025483366 HC PT THER PROC EA 15 MIN 7/16/2025 Katlyn Dimas, PT GP 1    02911199075 HC PT EVAL LOW COMPLEXITY 3 7/16/2025 Katlyn Dimas, PT GP 1            PT G-Codes  Outcome Measure Options: Optimal Instrument       Katlyn Dimas, PT  7/16/2025

## 2025-07-17 ENCOUNTER — HOSPITAL ENCOUNTER (OUTPATIENT)
Facility: HOSPITAL | Age: 50
Setting detail: THERAPIES SERIES
Discharge: HOME | End: 2025-07-17
Payer: COMMERCIAL

## 2025-07-17 DIAGNOSIS — M54.12 CERVICAL RADICULOPATHY: Primary | ICD-10-CM

## 2025-07-17 PROCEDURE — 97140 MANUAL THERAPY 1/> REGIONS: CPT | Performed by: PHYSICAL THERAPIST

## 2025-07-17 PROCEDURE — 97110 THERAPEUTIC EXERCISES: CPT | Performed by: PHYSICAL THERAPIST

## 2025-07-17 PROCEDURE — G0283 ELEC STIM OTHER THAN WOUND: HCPCS | Performed by: PHYSICAL THERAPIST

## 2025-07-17 PROCEDURE — 97161 PT EVAL LOW COMPLEX 20 MIN: CPT | Performed by: PHYSICAL THERAPIST

## 2025-07-17 NOTE — THERAPY EVALUATION
Physical Therapy Initial Evaluation and Plan of Care   [address] [place] [address]       Patient: Alli Craft   : 1975  Diagnosis/ICD-10 Code:      ICD-10-CM ICD-9-CM   1. Cervical radiculopathy  M54.12 723.4     Referring practitioner: Joya Welsh MD  Date of Initial Visit: 2025  Today's Date: 2025  Patient seen for 1 sessions           Subjective Questionnaire: NDI:2450=48% impairment      Subjective Evaluation    History of Present Illness  Mechanism of injury: Patient is a 49 yr old female referred to physical therapy with diagnosis of  Cervical radiculopathy.  Patient reports having issues with neck for awhile, but in past 2 weeks progressively worse. Patient states pain radiates down right arm. Patient is right hand dominate.    Pain  Current pain ratin  At worst pain ratin  Location: neck and right arm  Quality: radiating and dull ache  Relieving factors: change in position, medications, relaxation, rest and ice    Patient Goals  Patient goals for therapy: decreased pain           Objective          Postural Observations    Additional Postural Observation Details  Slightly forward head/forward rolled shoulders    Neurological Testing     Additional Neurological Details  Pain in C6-7 dermatome    Active Range of Motion   Cervical/Thoracic Spine   Cervical    Flexion: WFL  Extension: 10 degrees with pain  Right lateral flexion: with pain  Right rotation: 30 degrees with pain    Strength/Myotome Testing   Cervical Spine     Left   Normal strength    Right Shoulder     Planes of Motion   Flexion: WFL   Abduction: 4+   External rotation at 0°: 4+   Internal rotation at 0°: WFL     Cervical Flexibility Comments:   Bilateral tight upper traps/levator scapula   Bilateral tight pectoralis mm          Assessment & Plan       Assessment  Impairments: abnormal or restricted ROM, activity intolerance, impaired physical strength, lacks appropriate home exercise program and  pain with function   Functional limitations: uncomfortable because of pain and unable to perform repetitive tasks   Assessment details: Pt presents with limitations, noted by evaluation that impede patient's ability to tolerate functional mobility/activity/quality of sleep.  The skills of a therapist will be required to safely and effectively implement the following treatment plan to restore maximal level of function.      Prognosis: good    Goals  Plan Goals: 1. The patient has limited ROM.    LTG 1: 12 weeks:  The patient will demonstrate 60° of right cervical spine rotation in order to adequately monitor blind spots while driving and improve ability to perform activities of daily living.   STATUS:  New   STG 1a: 6 weeks:  The patient will demonstrate 50° of right cervical spine rotation  in order to adequately monitor blind spots while driving and improve ability to perform activities of daily living.   STATUS:  New      2. The patient has complaints of pain.   LTG 2: 12 weeks:  The patient will report 2/10 pain in order to more easily tolerate activities of daily living and improve sleep quality.   STATUS:  New   STG 2a: 6weeks:  The patient will report 4/10 pain.   STATUS:  New      3. The patient reports radicular symptoms in the right upper extremity.   LTG 3: 12 weeks:  The patient will report a decrease in radicular symptoms in the right upper extremity by 75%.   STATUS:  New   STG 3a: 6 weeks:  The patient will report a decrease in radicular symptoms in the right upper extremity by 50%.   STATUS:  New      4. Carrying, Moving, and Handling Objects Functional Limitation     LTG 4: 12 weeks:  The patient will demonstrate 20% limitation by achieving a score of 10/50 on the Neck Disability Index.   STATUS:  New   STG 4a: 6weeks:  The patient will demonstrate 30% limitation by achieving a score of 15/50 on the Neck Disability Index.     STATUS:  New         Plan  Therapy options: will be seen for skilled  therapy services  Planned modality interventions: cryotherapy and TENS  Planned therapy interventions: manual therapy, postural training, soft tissue mobilization, spinal/joint mobilization, strengthening, stretching, therapeutic activities, home exercise program, functional ROM exercises and flexibility  Frequency: 2x week  Duration in weeks: 12  Treatment plan discussed with: patient      History # of Personal Factors and/or Comorbidities: MODERATE (1-2)  Examination of Body System(s): # of elements: LOW (1-2)  Clinical Presentation: STABLE   Clinical Decision Making: LOW       Visit Diagnoses:    ICD-10-CM ICD-9-CM   1. Cervical radiculopathy  M54.12 723.4       Timed:  Manual Therapy:    15     mins  24017;  Therapeutic Exercise:    8     mins  52828;     Neuromuscular Catina:        mins  05000;    Therapeutic Activity:          mins  23818;     Gait Training:           mins  70178;     Ultrasound:          mins  49779;    Electrical Stimulation:         mins  05575 ( );    Untimed:  Electrical Stimulation:    15     mins  91505 ( );  Mechanical Traction:         mins  92990;    PT evaluation     Low Eval                        25   Mins  35664  Mod Eval                             Mins  49368  High Eval                           Mins  31701    Timed Treatment:   23   mins   Total Treatment:     63   mins    PT SIGNATURE: Lani Merrill PT     Electronically singed 7/17/2025    [place] PT license: [id number]        Initial Certification  Certification Period: 7/17/2025 thru 10/14/2025  I certify that the therapy services are furnished while this patient is under my care.  The services outlined above are required by this patient, and will be reviewed every 90 days.    PHYSICIAN: Joya Welsh MD  NPI: [id number]                                      DATE:     Please sign and return via fax to [phone].

## 2025-07-23 ENCOUNTER — HOSPITAL ENCOUNTER (OUTPATIENT)
Dept: OTHER | Facility: HOSPITAL | Age: 50
Discharge: HOME | End: 2025-07-23

## 2025-07-23 ENCOUNTER — HOSPITAL ENCOUNTER (EMERGENCY)
Facility: HOSPITAL | Age: 50
Discharge: HOME | End: 2025-07-23
Attending: EMERGENCY MEDICINE | Admitting: EMERGENCY MEDICINE
Payer: COMMERCIAL

## 2025-07-23 VITALS
BODY MASS INDEX: 40.7 KG/M2 | TEMPERATURE: 97.7 F | OXYGEN SATURATION: 97 % | HEIGHT: 73 IN | HEART RATE: 67 BPM | DIASTOLIC BLOOD PRESSURE: 96 MMHG | WEIGHT: 307.1 LBS | SYSTOLIC BLOOD PRESSURE: 152 MMHG | RESPIRATION RATE: 18 BRPM

## 2025-07-23 DIAGNOSIS — M50.30 DEGENERATIVE DISC DISEASE, CERVICAL: Primary | ICD-10-CM

## 2025-07-23 DIAGNOSIS — M54.12 CERVICAL RADICULOPATHY: ICD-10-CM

## 2025-07-23 PROCEDURE — 99282 EMERGENCY DEPT VISIT SF MDM: CPT

## 2025-07-23 RX ORDER — PREDNISONE 20 MG/1
TABLET ORAL
Qty: 18 TABLET | Refills: 0 | Status: SHIPPED | OUTPATIENT
Start: 2025-07-23 | End: 2025-08-01

## 2025-07-23 RX ORDER — BACLOFEN 10 MG/1
10 TABLET ORAL 3 TIMES DAILY PRN
Qty: 30 TABLET | Refills: 0 | Status: SHIPPED | OUTPATIENT
Start: 2025-07-23

## 2025-07-23 RX ORDER — CELECOXIB 100 MG/1
100 CAPSULE ORAL 2 TIMES DAILY
Qty: 14 CAPSULE | Refills: 0 | Status: SHIPPED | OUTPATIENT
Start: 2025-07-23 | End: 2025-07-30

## 2025-07-23 RX ORDER — CYCLOBENZAPRINE HCL 10 MG
10 TABLET ORAL NIGHTLY PRN
Qty: 20 TABLET | Refills: 0 | Status: SHIPPED | OUTPATIENT
Start: 2025-07-23

## 2025-07-23 NOTE — ED PROVIDER NOTES
"SHARED VISIT ATTESTATION:    This visit was performed by myself and an APC.  I personally approved the management plan/medical decision making and take responsibility for the patient management.      SHARED VISIT NOTE:    Patient is 49 y.o. year old male that presents to the ED for evaluation of neck pain.     Physical Exam    ED Course:    /96 (BP Location: Left arm, Patient Position: Sitting)   Pulse 67   Temp 97.7 °F (36.5 °C) (Oral)   Resp 18   Ht 185.4 cm (73\")   Wt (!) 139 kg (307 lb 1.6 oz)   SpO2 97%   BMI 40.52 kg/m²       The following orders were placed and all results were independently analyzed by me:  Orders Placed This Encounter   Procedures    Ambulatory Referral to Neurosurgery       Medications Given in the Emergency Department:  Medications - No data to display     ED Course:         Labs:    Lab Results (last 24 hours)       ** No results found for the last 24 hours. **             Imaging:    No Radiology Exams Resulted Within Past 24 Hours    MDM:    Procedures                         Sebas Clement MD  22:46 EDT  07/23/25         Sebas Clement MD  07/23/25 2247    "

## 2025-07-23 NOTE — ED PROVIDER NOTES
Time: 2:07 PM EDT  Date of encounter:  7/23/2025  Independent Historian/Clinical History and Information was obtained by:   Patient    History is limited by: N/A    Chief Complaint: Cervical pain      History of Present Illness:  Patient is a 49 y.o. year old male who presents to the emergency department for evaluation of cervical pain.  Patient states that has been going on for the past couple of weeks.  Patient has been seen by urgent care and in the emergency department.  Patient's primary care provider did order an MRI.  Patient has been taking Toradol, Flexeril, lidocaine patches, and steroids but has not gotten any relief.  Patient states after he was seen in the emergency department last time and had the Norflex and Toradol shots his pain was relieved for 3 days and then returned.  Patient states the pain does radiate down his right arm.  Patient has not seen a neurosurgeon but has been to physical therapy.      Patient Care Team  Primary Care Provider: Natalia Blakely MD    Past Medical History:     Allergies   Allergen Reactions    Penicillins Itching, Swelling and Rash     Past Medical History:   Diagnosis Date    Anxiety     COPD (chronic obstructive pulmonary disease)     Disease of thyroid gland     Essential hypertension     GERD (gastroesophageal reflux disease)     Irritable bowel syndrome     Rectal bleeding     Sleep apnea     CPAP    Vasovagal syncope      Past Surgical History:   Procedure Laterality Date    COLONOSCOPY  2013 2019    COLONOSCOPY N/A 9/23/2021    Procedure: COLONOSCOPY;  Surgeon: Alli Reid MD;  Location: [facility] ENDOSCOPY;  Service: Gastroenterology;  Laterality: N/A;    ENDOSCOPY  2013 2019    HERNIA REPAIR      KNEE SURGERY      KNEE REPAIR    THYROIDECTOMY      US GUIDED FINE NEEDLE ASPIRATION  10/17/2019    US GUIDED FINE NEEDLE ASPIRATION  10/17/2019     Family History   Problem Relation Age of Onset    Colon cancer Maternal Grandfather        Home  Medications:  Prior to Admission medications    Medication Sig Start Date End Date Taking? Authorizing Provider   allopurinol (ZYLOPRIM) 100 MG tablet Take 1 tablet by mouth Daily.  Patient not taking: Reported on 5/12/2025 8/21/21   Solitario Guan MD   cyclobenzaprine (FLEXERIL) 10 MG tablet Take 1 tablet by mouth 3 (Three) Times a Day As Needed for Muscle Spasms. 7/16/25   Aria Matamoros APRN   dicyclomine (BENTYL) 20 MG tablet TAKE 1 TABLET THREE TIMES A DAY AS NEEDED  Patient not taking: Reported on 5/12/2025 12/12/22   Kamryn Larose APRN   esomeprazole (NexIUM) 20 MG packet Take 20 mg by mouth Daily.  Patient not taking: Reported on 5/12/2025    Solitario Guan MD   fluticasone (FLONASE) 50 MCG/ACT nasal spray 1 spray into the nostril(s) as directed by provider Daily As Needed for Allergies.  Patient not taking: Reported on 5/12/2025    Solitario Guan MD   hydrocortisone (ANUSOL-HC) 25 MG suppository Insert 1 suppository into the rectum 2 (Two) Times a Day As Needed for Hemorrhoids (rectal discomfort).  Patient not taking: Reported on 5/12/2025 8/24/21   Kamryn Larose APRN   ketorolac (TORADOL) 10 MG tablet Take 1 tablet by mouth Every 6 (Six) Hours As Needed for Moderate Pain. 7/16/25   Aria Matamoros APRN   levothyroxine (Synthroid) 175 MCG tablet Take 1 tablet by mouth Daily. 7/29/24   Thompson Tadeo DO   levothyroxine (SYNTHROID, LEVOTHROID) 175 MCG tablet Take 1 tablet by mouth Every Morning.  Patient not taking: Reported on 5/12/2025 8/2/21   Solitario Guan MD   lidocaine (LIDODERM) 5 % Place 1 patch on the skin as directed by provider Daily. Remove & Discard patch within 12 hours or as directed by MD 7/16/25   Aria Matamoros APRN   lisinopril-hydrochlorothiazide (PRINZIDE,ZESTORETIC) 20-12.5 MG per tablet Take 1 tablet by mouth Every Morning. 7/29/24   Thompson Tadoe DO   metoprolol succinate XL (TOPROL-XL) 25 MG 24 hr tablet Take 1 tablet by mouth Daily.  "7/29/24   Thompson Tadeo DO   PARoxetine (PAXIL) 20 MG tablet Take 1 tablet by mouth Every Morning. 7/29/24   Thompson Tadeo DO        Social History:   Social History     Tobacco Use    Smoking status: Never    Smokeless tobacco: Current     Types: Snuff   Vaping Use    Vaping status: Never Used   Substance Use Topics    Alcohol use: Yes     Comment: OCC DRINKS, LESS THAN 1DRINK PER DAY, HAS BEEN DRINKING FOR 11-20 YEARS     Drug use: Never         Review of Systems:  Review of Systems   Eyes:  Negative for photophobia and visual disturbance.   Gastrointestinal:  Negative for nausea and vomiting.   Musculoskeletal:  Positive for back pain, neck pain and neck stiffness.   Neurological:  Negative for tremors, syncope, speech difficulty and headaches.        Physical Exam:  /96 (BP Location: Left arm, Patient Position: Sitting)   Pulse 67   Temp 97.7 °F (36.5 °C) (Oral)   Resp 18   Ht 185.4 cm (73\")   Wt (!) 139 kg (307 lb 1.6 oz)   SpO2 97%   BMI 40.52 kg/m²     Physical Exam  Vitals reviewed.   Constitutional:       Appearance: Normal appearance.   HENT:      Head: Normocephalic.   Eyes:      Extraocular Movements: Extraocular movements intact.      Conjunctiva/sclera: Conjunctivae normal.   Pulmonary:      Effort: Pulmonary effort is normal.   Abdominal:      General: There is no distension.   Musculoskeletal:      Cervical back: Pain with movement and spinous process tenderness present. Decreased range of motion.   Skin:     General: Skin is warm.      Coloration: Skin is not cyanotic.   Neurological:      Mental Status: He is alert and oriented to person, place, and time.   Psychiatric:         Attention and Perception: Attention and perception normal.         Mood and Affect: Mood normal.                    Medical Decision Making:      Comorbidities that affect care:    None    External Notes reviewed:    Previous ED Note: Reviewed medications and prior radiologic imaging      The following " orders were placed and all results were independently analyzed by me:  Orders Placed This Encounter   Procedures    Ambulatory Referral to Neurosurgery       Medications Given in the Emergency Department:  Medications - No data to display     ED Course:         Labs:    Lab Results (last 24 hours)       ** No results found for the last 24 hours. **             Imaging:    No Radiology Exams Resulted Within Past 24 Hours      Differential Diagnosis and Discussion:    Neck Pain: The patient presents with neck pain. My differential diagnosis includes but is not limited to acute spinal epidural abscess, acute spinal epidural bleed, meningitis, musculoskeletal neck pain, spinal fracture, and osteoarthritis.     PROCEDURES:        No orders to display       Procedures    MDM           Patient Care Considerations:    NARCOTICS: I considered prescribing opiate pain medication as an outpatient, however patient has been referred to neurosurgery and has MRI scheduled      Consultants/Shared Management Plan:    SHARED VISIT: I have discussed the case with my supervising physician, Dr. Clement who states he agrees with plan of care. The substantive portion of the medical decision was made by the attesting physician who made or approve the management plan and will take responsibility for the patient.  Clinical findings were discussed and ultimate disposition was made in consult with supervising physician.    Social Determinants of Health:    Patient is independent, reliable, and has access to care.       Disposition and Care Coordination:    Discharged: The patient is suitable and stable for discharge with no need for consideration of admission.    I have explained the patient´s condition, diagnoses and treatment plan based on the information available to me at this time. I have answered questions and addressed any concerns. The patient has a good  understanding of the patient´s diagnosis, condition, and treatment plan as can be  expected at this point. The vital signs have been stable. The patient´s condition is stable and appropriate for discharge from the emergency department.      The patient will pursue further outpatient evaluation with the primary care physician or other designated or consulting physician as outlined in the discharge instructions. They are agreeable to this plan of care and follow-up instructions have been explained in detail. The patient has received these instructions in written format and has expressed an understanding of the discharge instructions. The patient is aware that any significant change in condition or worsening of symptoms should prompt an immediate return to this or the closest emergency department or call to 911.  I have explained discharge medications and the need for follow up with the patient/caretakers. This was also printed in the discharge instructions. Patient was discharged with the following medications and follow up:      Medication List        New Prescriptions      baclofen 10 MG tablet  Commonly known as: LIORESAL  Take 1 tablet by mouth 3 (Three) Times a Day As Needed for Muscle Spasms.     celecoxib 100 MG capsule  Commonly known as: CeleBREX  Take 1 capsule by mouth 2 (Two) Times a Day for 7 days.     predniSONE 20 MG tablet  Commonly known as: DELTASONE  Take 1 tablet by mouth 3 (Three) Times a Day With Meals for 3 days, THEN 1 tablet 2 (Two) Times a Day for 3 days, THEN 1 tablet Daily for 3 days.  Start taking on: July 23, 2025            Changed      cyclobenzaprine 10 MG tablet  Commonly known as: FLEXERIL  Take 1 tablet by mouth At Night As Needed for Muscle Spasms.  What changed: when to take this            Stop      ketorolac 10 MG tablet  Commonly known as: TORADOL               Where to Get Your Medications        These medications were sent to [id number] - [place] - [address] AT [facility] [address] - [phone] [facility] [phone] FX   [address] [facility] [address]      Phone: [phone]   baclofen 10 MG tablet  celecoxib 100 MG capsule  cyclobenzaprine 10 MG tablet  predniSONE 20 MG tablet      [facility]  [address]  [facility] [address]  [facility] [id number] [phone]        Natalia Blakely MD  [address] [facility]  [facility] [address]  [phone]             Final diagnoses:   Degenerative disc disease, cervical   Cervical radiculopathy        ED Disposition       ED Disposition   Discharge    Condition   Stable    Comment   --               This medical record created using voice recognition software.             Seaver, Alyce B, APRN  07/23/25 1420

## 2025-07-23 NOTE — DISCHARGE INSTRUCTIONS
Follow up with your primary care provider. Return to ER if symptoms worsen or fail to improve.  You are being referred to neurosurgery they should call you in 2-3 business days, however, if you have not heard from them give them a call using the number provided.  Stop taking Toradol and begin taking Celebrex an anti-inflammatory medication. Take as prescribed. Do not take ibuprofen, Aleve, Motrin, or other anti-inflammatories while taking  . You make take Tylenol/acetaminophen between doses but do not exceed 4 grams in 24 hours.  Stop taking Flexeril, except you may take this at nighttime but not within 6 hours of baclofen, and begin taking baclofen a different muscle relaxer.  Please be aware that muscle relaxers may make you drowsy so do not drive or operate machinery.

## 2025-07-28 ENCOUNTER — TRANSCRIBE ORDERS (OUTPATIENT)
Facility: HOSPITAL | Age: 50
End: 2025-07-28
Payer: COMMERCIAL

## 2025-07-28 DIAGNOSIS — M25.511 ACUTE PAIN OF RIGHT SHOULDER: ICD-10-CM

## 2025-07-28 DIAGNOSIS — M54.2 CERVICALGIA: Primary | ICD-10-CM

## 2025-07-29 ENCOUNTER — HOSPITAL ENCOUNTER (OUTPATIENT)
Facility: HOSPITAL | Age: 50
Setting detail: THERAPIES SERIES
Discharge: HOME | End: 2025-07-29
Payer: COMMERCIAL

## 2025-07-29 DIAGNOSIS — M54.12 CERVICAL RADICULOPATHY: Primary | ICD-10-CM

## 2025-07-29 PROCEDURE — 97110 THERAPEUTIC EXERCISES: CPT | Performed by: PHYSICAL THERAPIST

## 2025-07-29 PROCEDURE — 97140 MANUAL THERAPY 1/> REGIONS: CPT | Performed by: PHYSICAL THERAPIST

## 2025-07-29 PROCEDURE — G0283 ELEC STIM OTHER THAN WOUND: HCPCS | Performed by: PHYSICAL THERAPIST

## 2025-07-29 NOTE — THERAPY TREATMENT NOTE
Outpatient Physical Therapy Ortho Treatment Note   [facility]     Patient Name: Alil Craft  : 1975  MRN: [id number]  Today's Date: 2025      Visit Date: 2025    Visit Dx:    ICD-10-CM ICD-9-CM   1. Cervical radiculopathy  M54.12 723.4       Patient Active Problem List   Diagnosis    Hematochezia    Loose stools    Family history of colon cancer    Family history of colon cancer in father        Past Medical History:   Diagnosis Date    Anxiety     COPD (chronic obstructive pulmonary disease)     Disease of thyroid gland     Essential hypertension     GERD (gastroesophageal reflux disease)     Irritable bowel syndrome     Rectal bleeding     Sleep apnea     CPAP    Vasovagal syncope         Past Surgical History:   Procedure Laterality Date    COLONOSCOPY  2013    COLONOSCOPY N/A 2021    Procedure: COLONOSCOPY;  Surgeon: Alli Reid MD;  Location: [facility] ENDOSCOPY;  Service: Gastroenterology;  Laterality: N/A;    ENDOSCOPY  2013    HERNIA REPAIR      KNEE SURGERY      KNEE REPAIR    THYROIDECTOMY      US GUIDED FINE NEEDLE ASPIRATION  10/17/2019    US GUIDED FINE NEEDLE ASPIRATION  10/17/2019                        PT Assessment/Plan       Row Name 25 0800          PT Assessment    Functional Limitations Limitations in functional capacity and performance  -RO     Impairments Impaired flexibility;Impaired postural alignment;Joint mobility;Muscle strength;Pain;Posture;Range of motion  -RO     Assessment Comments Patient demonstrated weakness in right shoulder/scapula with exercises.  -RO     Rehab Potential Good  -RO     Patient/caregiver participated in establishment of treatment plan and goals Yes  -RO     Patient would benefit from skilled therapy intervention Yes  -RO        PT Plan    PT Plan Comments continue per POC  -RO               User Key  (r) = Recorded By, (t) = Taken By, (c) = Cosigned By      Initials Name Provider Type    RO Lani Merrill  PT Physical Therapist                     Modalities       Row Name 07/29/25 0800             Ice    Ice Applied Yes  -RO      Location right neck/scapula  -RO      PT Ice Rx Minutes 15  -RO         ELECTRICAL STIMULATION    Attended/Unattended Unattended  -RO      Stimulation Type IFC  -RO      Max mAmp 15  -RO      Location/Electrode Placement/Other right cervical/scapulat  -RO      PT E-Stim Unattended Minutes 15  -RO                User Key  (r) = Recorded By, (t) = Taken By, (c) = Cosigned By      Initials Name Provider Type    RO Lani Merrill, PT Physical Therapist                   OP Exercises       Row Name 07/29/25 0800             Subjective    Subjective Comments Patient states in the mornings pain 2/10, but by end of day pain 7/10 and difficulty concentrating at work secondary to the pain.  -RO         Subjective Pain    Able to rate subjective pain? yes  -RO      Pre-Treatment Pain Level 2  -RO      Post-Treatment Pain Level 2  -RO      Subjective Pain Comment neck and right arm  -RO         Total Minutes    22011 - PT Therapeutic Exercise Minutes 14  -RO      47705 - PT Manual Therapy Minutes 16  -RO         Exercise 1    Exercise Name 1 UBE  -RO      Cueing 1 Verbal;Demo  -RO      Sets 1 Reverse  -RO      Time 1 x2 min  -RO         Exercise 2    Exercise Name 2 Shoulder ER  -RO      Cueing 2 Verbal;Tactile;Demo  -RO      Sets 2 green tube  -RO      Reps 2 x20 each UE  -RO         Exercise 3    Exercise Name 3 shoulder extension  -RO      Cueing 3 Verbal;Demo;Tactile  -RO      Sets 3 blue tube  -RO      Reps 3 x15-20 each UE  -RO         Exercise 4    Exercise Name 4 chin tucks on blue foam  -RO      Cueing 4 Verbal  -RO      Reps 4 x10  -RO                User Key  (r) = Recorded By, (t) = Taken By, (c) = Cosigned By      Initials Name Provider Type    RO Lani Merrill, PT Physical Therapist                             Manual Rx (Last 36 Hours)       Manual Treatments       Row Name  07/29/25 0800             Total Minutes    63191 - PT Manual Therapy Minutes 16  -RO         Manual Rx 1    Manual Rx 1 Location cervical/scapular  -RO      Manual Rx 1 Type STM/stretches/gentle traction  -RO      Manual Rx 1 Grade supine and left sidelying  -RO      Manual Rx 1 Duration x16 min  -RO                User Key  (r) = Recorded By, (t) = Taken By, (c) = Cosigned By      Initials Name Provider Type    RO Lani Merrill, PT Physical Therapist                                       Time Calculation:   Timed Charges  91488 - PT Therapeutic Exercise Minutes: 14  74269 - PT Manual Therapy Minutes: 16  Untimed Charges  PT E-Stim Unattended Minutes: 15  PT Ice Rx Minutes: 15  Total Minutes  Timed Charges Total Minutes: 30  Untimed Charges Total Minutes: 30   Total Minutes: 60                Lani Merrill PT  7/29/2025

## 2025-08-04 ENCOUNTER — HOSPITAL ENCOUNTER (OUTPATIENT)
Facility: HOSPITAL | Age: 50
Setting detail: THERAPIES SERIES
Discharge: HOME | End: 2025-08-04
Payer: COMMERCIAL

## 2025-08-04 DIAGNOSIS — M54.12 CERVICAL RADICULOPATHY: Primary | ICD-10-CM

## 2025-08-04 PROCEDURE — G0283 ELEC STIM OTHER THAN WOUND: HCPCS | Performed by: PHYSICAL THERAPIST

## 2025-08-04 PROCEDURE — 97140 MANUAL THERAPY 1/> REGIONS: CPT | Performed by: PHYSICAL THERAPIST

## 2025-08-04 PROCEDURE — 97110 THERAPEUTIC EXERCISES: CPT | Performed by: PHYSICAL THERAPIST

## 2025-08-14 ENCOUNTER — OFFICE VISIT (OUTPATIENT)
Dept: NEUROSURGERY | Facility: CLINIC | Age: 50
End: 2025-08-14
Payer: COMMERCIAL

## 2025-08-14 VITALS
HEART RATE: 82 BPM | HEIGHT: 73 IN | DIASTOLIC BLOOD PRESSURE: 81 MMHG | BODY MASS INDEX: 39.76 KG/M2 | SYSTOLIC BLOOD PRESSURE: 140 MMHG | WEIGHT: 300 LBS

## 2025-08-14 DIAGNOSIS — M47.812 CERVICAL SPONDYLOSIS WITHOUT MYELOPATHY: ICD-10-CM

## 2025-08-14 DIAGNOSIS — M48.02 FORAMINAL STENOSIS OF CERVICAL REGION: Primary | ICD-10-CM

## 2025-08-14 RX ORDER — GABAPENTIN 300 MG/1
CAPSULE ORAL
COMMUNITY
Start: 2025-08-07

## 2025-08-14 RX ORDER — FOLIC ACID 1 MG/1
1 TABLET ORAL DAILY
COMMUNITY
Start: 2025-06-27

## 2025-08-14 RX ORDER — HYDROCODONE BITARTRATE AND ACETAMINOPHEN 5; 325 MG/1; MG/1
1 TABLET ORAL EVERY 6 HOURS PRN
Qty: 20 TABLET | Refills: 0 | Status: SHIPPED | OUTPATIENT
Start: 2025-08-14

## 2025-08-15 ENCOUNTER — PATIENT ROUNDING (BHMG ONLY) (OUTPATIENT)
Dept: NEUROSURGERY | Facility: CLINIC | Age: 50
End: 2025-08-15
Payer: COMMERCIAL

## 2025-08-21 ENCOUNTER — HOSPITAL ENCOUNTER (EMERGENCY)
Facility: HOSPITAL | Age: 50
Discharge: HOME | End: 2025-08-21
Attending: EMERGENCY MEDICINE
Payer: COMMERCIAL

## 2025-08-27 ENCOUNTER — TRANSCRIBE ORDERS (OUTPATIENT)
Dept: ADMINISTRATIVE | Facility: HOSPITAL | Age: 50
End: 2025-08-27
Payer: COMMERCIAL

## 2025-08-27 DIAGNOSIS — M50.223 HERNIATED NUCLEUS PULPOSUS, C6-7: Primary | ICD-10-CM

## 2025-08-29 ENCOUNTER — LAB (OUTPATIENT)
Facility: HOSPITAL | Age: 50
End: 2025-08-29
Payer: COMMERCIAL

## 2025-08-29 ENCOUNTER — HOSPITAL ENCOUNTER (OUTPATIENT)
Facility: HOSPITAL | Age: 50
Discharge: HOME | End: 2025-08-29
Payer: COMMERCIAL

## 2025-08-29 DIAGNOSIS — M50.223 HERNIATED NUCLEUS PULPOSUS, C6-7: ICD-10-CM

## 2025-08-29 LAB
ALBUMIN SERPL-MCNC: 4.5 G/DL (ref 3.5–5.2)
ALBUMIN/GLOB SERPL: 1.5 G/DL
ALP SERPL-CCNC: 61 U/L (ref 39–117)
ALT SERPL W P-5'-P-CCNC: 73 U/L (ref 1–41)
ANION GAP SERPL CALCULATED.3IONS-SCNC: 12 MMOL/L (ref 5–15)
AST SERPL-CCNC: 65 U/L (ref 1–40)
BILIRUB SERPL-MCNC: 0.8 MG/DL (ref 0–1.2)
BUN SERPL-MCNC: 15 MG/DL (ref 6–20)
BUN/CREAT SERPL: 12.5 (ref 7–25)
CALCIUM SPEC-SCNC: 9.4 MG/DL (ref 8.6–10.5)
CHLORIDE SERPL-SCNC: 100 MMOL/L (ref 98–107)
CO2 SERPL-SCNC: 26 MMOL/L (ref 22–29)
CREAT SERPL-MCNC: 1.2 MG/DL (ref 0.76–1.27)
EGFRCR SERPLBLD CKD-EPI 2021: 74.6 ML/MIN/1.73
GLOBULIN UR ELPH-MCNC: 3.1 GM/DL
GLUCOSE SERPL-MCNC: 135 MG/DL (ref 65–99)
HBA1C MFR BLD: 5.3 % (ref 4.8–5.6)
POTASSIUM SERPL-SCNC: 4 MMOL/L (ref 3.5–5.2)
PROT SERPL-MCNC: 7.6 G/DL (ref 6–8.5)
QT INTERVAL: 385 MS
QTC INTERVAL: 461 MS
SODIUM SERPL-SCNC: 138 MMOL/L (ref 136–145)

## 2025-08-29 PROCEDURE — 83036 HEMOGLOBIN GLYCOSYLATED A1C: CPT

## 2025-08-29 PROCEDURE — 93005 ELECTROCARDIOGRAM TRACING: CPT

## 2025-08-29 PROCEDURE — 36415 COLL VENOUS BLD VENIPUNCTURE: CPT

## 2025-08-29 PROCEDURE — 80053 COMPREHEN METABOLIC PANEL: CPT

## (undated) DEVICE — SOL IRRG H2O PL/BG 1000ML STRL

## (undated) DEVICE — Device: Brand: DEFENDO AIR/WATER/SUCTION AND BIOPSY VALVE

## (undated) DEVICE — COLON KIT: Brand: MEDLINE INDUSTRIES, INC.